# Patient Record
Sex: MALE | Race: BLACK OR AFRICAN AMERICAN | NOT HISPANIC OR LATINO | Employment: FULL TIME | URBAN - METROPOLITAN AREA
[De-identification: names, ages, dates, MRNs, and addresses within clinical notes are randomized per-mention and may not be internally consistent; named-entity substitution may affect disease eponyms.]

---

## 2019-07-17 ENCOUNTER — HOSPITAL ENCOUNTER (INPATIENT)
Facility: HOSPITAL | Age: 49
LOS: 2 days | Discharge: HOME | DRG: 305 | End: 2019-07-19
Attending: EMERGENCY MEDICINE | Admitting: EMERGENCY MEDICINE
Payer: OTHER GOVERNMENT

## 2019-07-17 DIAGNOSIS — I16.1 HYPERTENSIVE EMERGENCY: Primary | ICD-10-CM

## 2019-07-17 DIAGNOSIS — N18.2 CKD (CHRONIC KIDNEY DISEASE) STAGE 2, GFR 60-89 ML/MIN: ICD-10-CM

## 2019-07-17 DIAGNOSIS — R07.9 CHEST PAIN: ICD-10-CM

## 2019-07-17 LAB
ALBUMIN SERPL BCP-MCNC: 3.9 G/DL (ref 3.5–5.2)
ALP SERPL-CCNC: 64 U/L (ref 55–135)
ALT SERPL W/O P-5'-P-CCNC: 28 U/L (ref 10–44)
ANION GAP SERPL CALC-SCNC: 10 MMOL/L (ref 8–16)
AST SERPL-CCNC: 19 U/L (ref 10–40)
BASOPHILS # BLD AUTO: 0.04 K/UL (ref 0–0.2)
BASOPHILS NFR BLD: 1 % (ref 0–1.9)
BILIRUB SERPL-MCNC: 0.3 MG/DL (ref 0.1–1)
BNP SERPL-MCNC: 14 PG/ML (ref 0–99)
BUN SERPL-MCNC: 14 MG/DL (ref 6–20)
CALCIUM SERPL-MCNC: 9.3 MG/DL (ref 8.7–10.5)
CHLORIDE SERPL-SCNC: 108 MMOL/L (ref 95–110)
CO2 SERPL-SCNC: 24 MMOL/L (ref 23–29)
CREAT SERPL-MCNC: 1.3 MG/DL (ref 0.5–1.4)
DIFFERENTIAL METHOD: ABNORMAL
EOSINOPHIL # BLD AUTO: 0.1 K/UL (ref 0–0.5)
EOSINOPHIL NFR BLD: 3.1 % (ref 0–8)
ERYTHROCYTE [DISTWIDTH] IN BLOOD BY AUTOMATED COUNT: 14.6 % (ref 11.5–14.5)
EST. GFR  (AFRICAN AMERICAN): >60 ML/MIN/1.73 M^2
EST. GFR  (NON AFRICAN AMERICAN): >60 ML/MIN/1.73 M^2
GLUCOSE SERPL-MCNC: 100 MG/DL (ref 70–110)
HCT VFR BLD AUTO: 41.9 % (ref 40–54)
HGB BLD-MCNC: 13.3 G/DL (ref 14–18)
IMM GRANULOCYTES # BLD AUTO: 0.01 K/UL (ref 0–0.04)
IMM GRANULOCYTES NFR BLD AUTO: 0.3 % (ref 0–0.5)
LYMPHOCYTES # BLD AUTO: 1.6 K/UL (ref 1–4.8)
LYMPHOCYTES NFR BLD: 41.2 % (ref 18–48)
MCH RBC QN AUTO: 28.6 PG (ref 27–31)
MCHC RBC AUTO-ENTMCNC: 31.7 G/DL (ref 32–36)
MCV RBC AUTO: 90 FL (ref 82–98)
MONOCYTES # BLD AUTO: 0.5 K/UL (ref 0.3–1)
MONOCYTES NFR BLD: 13.4 % (ref 4–15)
NEUTROPHILS # BLD AUTO: 1.6 K/UL (ref 1.8–7.7)
NEUTROPHILS NFR BLD: 41 % (ref 38–73)
NRBC BLD-RTO: 0 /100 WBC
PLATELET # BLD AUTO: 242 K/UL (ref 150–350)
PMV BLD AUTO: 10.7 FL (ref 9.2–12.9)
POTASSIUM SERPL-SCNC: 3.3 MMOL/L (ref 3.5–5.1)
PROT SERPL-MCNC: 7 G/DL (ref 6–8.4)
RBC # BLD AUTO: 4.65 M/UL (ref 4.6–6.2)
SODIUM SERPL-SCNC: 142 MMOL/L (ref 136–145)
TROPONIN I SERPL DL<=0.01 NG/ML-MCNC: 0.02 NG/ML (ref 0–0.03)
TROPONIN I SERPL DL<=0.01 NG/ML-MCNC: 0.03 NG/ML (ref 0–0.03)
WBC # BLD AUTO: 3.81 K/UL (ref 3.9–12.7)

## 2019-07-17 PROCEDURE — 99223 1ST HOSP IP/OBS HIGH 75: CPT | Mod: ,,, | Performed by: HOSPITALIST

## 2019-07-17 PROCEDURE — 84484 ASSAY OF TROPONIN QUANT: CPT

## 2019-07-17 PROCEDURE — 83880 ASSAY OF NATRIURETIC PEPTIDE: CPT

## 2019-07-17 PROCEDURE — 96365 THER/PROPH/DIAG IV INF INIT: CPT

## 2019-07-17 PROCEDURE — 80053 COMPREHEN METABOLIC PANEL: CPT

## 2019-07-17 PROCEDURE — 99291 CRITICAL CARE FIRST HOUR: CPT | Mod: 25

## 2019-07-17 PROCEDURE — 93010 EKG 12-LEAD: ICD-10-PCS | Mod: ,,, | Performed by: INTERNAL MEDICINE

## 2019-07-17 PROCEDURE — 12000002 HC ACUTE/MED SURGE SEMI-PRIVATE ROOM

## 2019-07-17 PROCEDURE — 63600175 PHARM REV CODE 636 W HCPCS: Performed by: EMERGENCY MEDICINE

## 2019-07-17 PROCEDURE — 93010 ELECTROCARDIOGRAM REPORT: CPT | Mod: ,,, | Performed by: INTERNAL MEDICINE

## 2019-07-17 PROCEDURE — 96366 THER/PROPH/DIAG IV INF ADDON: CPT

## 2019-07-17 PROCEDURE — 99291 CRITICAL CARE FIRST HOUR: CPT | Mod: ,,, | Performed by: EMERGENCY MEDICINE

## 2019-07-17 PROCEDURE — 25000003 PHARM REV CODE 250: Performed by: HOSPITALIST

## 2019-07-17 PROCEDURE — 99291 PR CRITICAL CARE, E/M 30-74 MINUTES: ICD-10-PCS | Mod: ,,, | Performed by: EMERGENCY MEDICINE

## 2019-07-17 PROCEDURE — 96375 TX/PRO/DX INJ NEW DRUG ADDON: CPT

## 2019-07-17 PROCEDURE — 93005 ELECTROCARDIOGRAM TRACING: CPT

## 2019-07-17 PROCEDURE — 25000003 PHARM REV CODE 250: Performed by: EMERGENCY MEDICINE

## 2019-07-17 PROCEDURE — 25000003 PHARM REV CODE 250: Performed by: STUDENT IN AN ORGANIZED HEALTH CARE EDUCATION/TRAINING PROGRAM

## 2019-07-17 PROCEDURE — 99223 PR INITIAL HOSPITAL CARE,LEVL III: ICD-10-PCS | Mod: ,,, | Performed by: HOSPITALIST

## 2019-07-17 PROCEDURE — 85025 COMPLETE CBC W/AUTO DIFF WBC: CPT

## 2019-07-17 RX ORDER — AMLODIPINE BESYLATE 10 MG/1
10 TABLET ORAL DAILY
Status: DISCONTINUED | OUTPATIENT
Start: 2019-07-17 | End: 2019-07-19 | Stop reason: HOSPADM

## 2019-07-17 RX ORDER — ENOXAPARIN SODIUM 100 MG/ML
40 INJECTION SUBCUTANEOUS EVERY 24 HOURS
Status: DISCONTINUED | OUTPATIENT
Start: 2019-07-17 | End: 2019-07-19 | Stop reason: HOSPADM

## 2019-07-17 RX ORDER — HYDROCHLOROTHIAZIDE 25 MG/1
25 TABLET ORAL DAILY
Status: DISCONTINUED | OUTPATIENT
Start: 2019-07-17 | End: 2019-07-19 | Stop reason: HOSPADM

## 2019-07-17 RX ORDER — HYDRALAZINE HYDROCHLORIDE 25 MG/1
25 TABLET, FILM COATED ORAL EVERY 6 HOURS PRN
Status: DISCONTINUED | OUTPATIENT
Start: 2019-07-17 | End: 2019-07-19 | Stop reason: HOSPADM

## 2019-07-17 RX ORDER — SODIUM CHLORIDE 0.9 % (FLUSH) 0.9 %
10 SYRINGE (ML) INJECTION
Status: DISCONTINUED | OUTPATIENT
Start: 2019-07-17 | End: 2019-07-19 | Stop reason: HOSPADM

## 2019-07-17 RX ORDER — HYDROCHLOROTHIAZIDE 25 MG/1
25 TABLET ORAL DAILY
Status: DISCONTINUED | OUTPATIENT
Start: 2019-07-18 | End: 2019-07-17

## 2019-07-17 RX ORDER — ENALAPRILAT 1.25 MG/ML
1.25 INJECTION INTRAVENOUS
Status: COMPLETED | OUTPATIENT
Start: 2019-07-17 | End: 2019-07-17

## 2019-07-17 RX ORDER — POTASSIUM CHLORIDE 20 MEQ/1
40 TABLET, EXTENDED RELEASE ORAL ONCE
Status: COMPLETED | OUTPATIENT
Start: 2019-07-17 | End: 2019-07-17

## 2019-07-17 RX ORDER — NITROGLYCERIN 20 MG/100ML
20 INJECTION INTRAVENOUS CONTINUOUS
Status: DISCONTINUED | OUTPATIENT
Start: 2019-07-17 | End: 2019-07-17

## 2019-07-17 RX ORDER — ACETAMINOPHEN 325 MG/1
650 TABLET ORAL EVERY 6 HOURS PRN
Status: DISCONTINUED | OUTPATIENT
Start: 2019-07-17 | End: 2019-07-19 | Stop reason: HOSPADM

## 2019-07-17 RX ORDER — SODIUM CHLORIDE 0.9 % (FLUSH) 0.9 %
10 SYRINGE (ML) INJECTION
Status: DISCONTINUED | OUTPATIENT
Start: 2019-07-17 | End: 2019-07-17

## 2019-07-17 RX ORDER — NITROGLYCERIN 0.4 MG/1
0.4 TABLET SUBLINGUAL EVERY 5 MIN PRN
Status: DISCONTINUED | OUTPATIENT
Start: 2019-07-17 | End: 2019-07-17

## 2019-07-17 RX ORDER — ASPIRIN 325 MG
325 TABLET ORAL
Status: DISCONTINUED | OUTPATIENT
Start: 2019-07-17 | End: 2019-07-17

## 2019-07-17 RX ADMIN — AMLODIPINE BESYLATE 10 MG: 10 TABLET ORAL at 10:07

## 2019-07-17 RX ADMIN — ENALAPRILAT 1.25 MG: 2.5 INJECTION INTRAVENOUS at 06:07

## 2019-07-17 RX ADMIN — HYDRALAZINE HYDROCHLORIDE 25 MG: 25 TABLET, FILM COATED ORAL at 11:07

## 2019-07-17 RX ADMIN — NITROGLYCERIN 0.4 MG: 0.4 TABLET SUBLINGUAL at 04:07

## 2019-07-17 RX ADMIN — POTASSIUM CHLORIDE 40 MEQ: 20 TABLET, EXTENDED RELEASE ORAL at 06:07

## 2019-07-17 RX ADMIN — NITROGLYCERIN 20 MCG/MIN: 20 INJECTION INTRAVENOUS at 05:07

## 2019-07-17 RX ADMIN — HYDROCHLOROTHIAZIDE 25 MG: 25 TABLET ORAL at 10:07

## 2019-07-17 NOTE — ED TRIAGE NOTES
Per /149 en route, pt reports 4/10 chest pain brought down to 1/10 with 324 asp and 3 sublingual sprays nitroglycerin. 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 Reports taking 1 Goody's prior to calling EMS. Denies n/v/d, SOB, pain with urination. Current pain 0.5/10.

## 2019-07-17 NOTE — ED NOTES
Patient identifiers verified and correct for Jonah CHET Gordon.    LOC: The patient is awake and alert; oriented x 3 and speaking appropriately.  APPEARANCE: Patient resting comfortably, patient is clean and well groomed.  SKIN: warm and dry, normal skin turgor & moist mucus membranes, skin intact, no breakdown noted.  MUSCULOSKELETAL: Patient moving all extremities well, no obvious swelling or deformities noted.  RESPIRATORY: Airway is open and patent; respirations are spontaneous, normal effort and rate. Denies SOB.  CARDIAC: Patient has a normal rate, no peripheral edema noted, capillary refill < 3 seconds; non-radiating left pectoral chest pain 4/10 originally, 0.5/10 currently.  ABDOMEN: Soft and non tender to palpation, no distention noted. Denies n/v/d.    Pt placed on cardiac monitor, continuous pulse ox, cycling blood pressures. Side rails up x2, call bell in reach, bed in low position with brake engaged. Will continue to monitor.

## 2019-07-17 NOTE — PROVIDER PROGRESS NOTES - EMERGENCY DEPT.
Encounter Date: 7/17/2019    ED Physician Progress Notes           ED Attending Sign-out Progress Note:  Patient signed out to me at shift change by Dr. Hastings to f/u labs, response to treatment and overall disposition. It was expected that pt would be admitted for hypertensive emergency.    Patient complaining of recurrent chest pain. Was nitroglycerin responsive earlier. /93. Nitro infusion ordered.    Patient had improvement of pain with nitro, but BP dropped with minimal amounts. Nitro infusion stopped and IV enalaprilat given.    Discussed with internal medicine who admitted pt.    Attending Critical Care:   Critical Care Times:   ==============================================================  ·            Total Critical Care Time - exclusive of teaching and procedural time: 30 minutes.  ==============================================================  Critical care was necessary to treat or prevent imminent or life-threatening deterioration of the following conditions: hypertensive emergency.   Critical care was time spent personally by me on the following activities: obtaining history from patient or relative, examination of patient, review of old charts, ordering lab, x-rays, and/or EKG, development of treatment plan with patient or relative, ordering and performing treatments and interventions, evaluation of patient's response to treatment, discussion with consultants, interpretation of cardiac measurements and re-evaluation of patient's condition.   Critical Care Condition: potentially life-threatening.       ED Clinical Impression:    ICD-10-CM ICD-9-CM   1. Hypertensive emergency I16.1 401.9   2. Chest pain R07.9 786.50

## 2019-07-17 NOTE — ED PROVIDER NOTES
Encounter Date: 7/17/2019    SCRIBE #1 NOTE: I, Vickey Ndiaye, am scribing for, and in the presence of,  Dr. Hastings. I have scribed the following portions of the note - Other sections scribed: HPI, ROS, PE.       History     Chief Complaint   Patient presents with    Chest Pain     left sided chest pain x 1 hour     49 y.o. male with history of HTN, presenting with left-sided chest pain. Patient reports around 2:30pm one side chest pain. Patient reports the pain as non radiating. Patient's pain is described as an achy mild pain. The pain at worst was a 4/10 and now pain is a .5/10. Patient is noncompliant with blood pressure medicine. Patient denies nausea, vomiting, diarrhea, fever, cough, shortness of breath, abdominal pain, or dysuria. Patient reports Asprin, goodies powder, and 3 shots of nitroglycerin have moderated the pain.     The history is provided by the patient and medical records.     Review of patient's allergies indicates:  No Known Allergies  Past Medical History:   Diagnosis Date    Hypertension      Past Surgical History:   Procedure Laterality Date    WISDOM TOOTH EXTRACTION       History reviewed. No pertinent family history.  Social History     Tobacco Use    Smoking status: Never Smoker    Smokeless tobacco: Never Used   Substance Use Topics    Alcohol use: Yes    Drug use: Never     Review of Systems   Constitutional: Negative for fever.   HENT: Negative for sore throat.    Eyes: Negative for visual disturbance.   Respiratory: Negative for cough and shortness of breath.    Cardiovascular: Positive for chest pain.   Gastrointestinal: Negative for abdominal pain, diarrhea, nausea and vomiting.   Genitourinary: Negative for dysuria.   Musculoskeletal: Negative for neck pain.   Skin: Negative for rash and wound.   Allergic/Immunologic: Negative for immunocompromised state.   Neurological: Negative for syncope.   Psychiatric/Behavioral: Negative for confusion.   All other systems reviewed  and are negative.      Physical Exam     Initial Vitals [07/17/19 1537]   BP Pulse Resp Temp SpO2   (!) 210/132 79 16 98 °F (36.7 °C) 100 %      MAP       --         Physical Exam    Nursing note and vitals reviewed.  Constitutional: He appears well-developed and well-nourished. He is not diaphoretic. No distress.   HENT:   Head: Normocephalic and atraumatic.   Nose: Nose normal.   Eyes: Conjunctivae are normal. Right eye exhibits no discharge. Left eye exhibits no discharge.   Neck: Normal range of motion. Neck supple.   Cardiovascular: Normal rate, regular rhythm and normal heart sounds. Exam reveals no gallop and no friction rub.    No murmur heard.  Pulmonary/Chest: Breath sounds normal. No respiratory distress. He has no wheezes. He has no rhonchi. He has no rales.   Abdominal: Soft. He exhibits no distension. There is no tenderness. There is no rebound and no guarding.   Musculoskeletal: Normal range of motion. He exhibits no edema or tenderness.   Neurological: He is alert and oriented to person, place, and time. GCS score is 15. GCS eye subscore is 4. GCS verbal subscore is 5. GCS motor subscore is 6.   Skin: Skin is warm and dry. No rash noted. No erythema.   Psychiatric: He has a normal mood and affect. His behavior is normal. Judgment and thought content normal.         ED Course   Critical Care  Date/Time: 7/17/2019 9:15 PM  Performed by: Dat Hastings MD  Authorized by: Dat Hastings MD   Direct patient critical care time: 5 minutes  Additional history critical care time: 5 minutes  Ordering / reviewing critical care time: 5 minutes  Documentation critical care time: 5 minutes  Consulting other physicians critical care time: 5 minutes  Consult with family critical care time: 5 minutes  Other critical care time: 0 minutes  Total critical care time (exclusive of procedural time) : 30 minutes  Critical care time was exclusive of separately billable procedures and treating other patients.  Critical care  was necessary to treat or prevent imminent or life-threatening deterioration of the following conditions: circulatory failure and cardiac failure.  Critical care was time spent personally by me on the following activities: development of treatment plan with patient or surrogate, evaluation of patient's response to treatment, examination of patient, obtaining history from patient or surrogate, ordering and performing treatments and interventions, ordering and review of laboratory studies, ordering and review of radiographic studies, pulse oximetry and re-evaluation of patient's condition.  Subsequent provider of critical care: I assumed direction of critical care for this patient from another provider of my specialty.        Labs Reviewed   CBC W/ AUTO DIFFERENTIAL - Abnormal; Notable for the following components:       Result Value    WBC 3.81 (*)     Hemoglobin 13.3 (*)     Mean Corpuscular Hemoglobin Conc 31.7 (*)     RDW 14.6 (*)     Gran # (ANC) 1.6 (*)     All other components within normal limits   COMPREHENSIVE METABOLIC PANEL - Abnormal; Notable for the following components:    Potassium 3.3 (*)     All other components within normal limits   TROPONIN I   B-TYPE NATRIURETIC PEPTIDE   TROPONIN I        ECG Results          EKG 12-lead (In process)  Result time 07/17/19 16:49:58    In process by Interface, Lab In [facility] (07/17/19 16:49:58)                 Narrative:    Test Reason : R07.9,    Vent. Rate : 081 BPM     Atrial Rate : 081 BPM     P-R Int : 178 ms          QRS Dur : 088 ms      QT Int : 394 ms       P-R-T Axes : 056 098 007 degrees     QTc Int : 457 ms    Normal sinus rhythm with sinus arrhythmia  Rightward axis  Borderline Abnormal ECG  No previous ECGs available    Referred By: [email or web address]   SELF           Confirmed By:                             Imaging Results          X-Ray Chest AP Portable (Final result)  Result time 07/17/19 16:37:16    Final result by Cristiano Shanks MD (07/17/19 16:37:16)                  Impression:      Enlarged cardiac silhouette without radiographic evidence of failure or focal consolidation.      Electronically signed by: Cristiano Shanks MD  Date:    07/17/2019  Time:    16:37             Narrative:    EXAMINATION:  XR CHEST AP PORTABLE    CLINICAL HISTORY:  Chest Pain;    TECHNIQUE:  AP portable view of the chest.    COMPARISON:  Lumbar spine series 03/14/2012    FINDINGS:  Monitoring leads overlie the chest.  Large body habitus.    Cardiac silhouette is mildly enlarged without evidence of failure.  Mediastinal contours are within normal limits.  Pulmonary vasculature and hilar regions are within normal limits.  The lungs are symmetrically well expanded without focal consolidation, pleural effusion or pneumothorax.  No acute osseous process seen.  PA and lateral views can be obtained.                                 Medical Decision Making:   History:   Old Medical Records: I decided to obtain old medical records.  Initial Assessment:   49 y.o. male with history of HTN, presenting with left-sided chest pain and high BP. Initial impression is hypertensive emergency.   Differential Diagnosis:   Hypertensive emergency:  History of uncontrolled HTN, high BP >200 systolic, chest pain.    MI:  Chest pain, history of uncontrolled HTN        Independently Interpreted Test(s):   I have ordered and independently interpreted X-rays - see prior notes.  I have ordered and independently interpreted EKG Reading(s) - see prior notes  Clinical Tests:   Lab Tests: Ordered and Reviewed  Radiological Study: Ordered and Reviewed  Medical Tests: Ordered and Reviewed  ED Management:  4:30 PM  Troponin, BNP, EKG, CBC, comprehensive metabolic panel, and CXR  Nitroglycerin 0.4 tablet.    5:39 PM  Patient complaining that the chest pain has returned. Nitroglycerin changed to drip.    5:58 PM  Chest pain resolved. K is 3.4. KCL 40 mEq is ordered.  Other:   I discussed test(s) with the performing  physician.            Bev Attestation:   Scribe #1: I performed the above scribed service and the documentation accurately describes the services I performed. I attest to the accuracy of the note.            ED Course as of Jul 17 1812 Wed Jul 17, 2019   1745 41-year-old male past medical history of hypertension presents today with hypertensive emergency.  Patient reports chest pain since 2:00 p.m. today.  On arrival afebrile.  Hypertensive to 210/132.  EKG no STEMI.  Will initiate cardiac workup.    [BD]   1746 Blood pressure improved and chest pain resolved with nitroglycerin.  Signed out to Dr. Bartlett pending labs and likely admission for hypertensive emergency.     [BD]      ED Course User Index  [BD] Dat Hastings MD     Clinical Impression:       ICD-10-CM ICD-9-CM   1. Hypertensive emergency I16.1 401.9   2. Chest pain R07.9 786.50   3. Myocardial infarction, unspecified MI type, unspecified artery I21.9 410.90                                Dat Hastings MD  07/17/19 2087

## 2019-07-18 PROBLEM — N18.2 CKD (CHRONIC KIDNEY DISEASE) STAGE 2, GFR 60-89 ML/MIN: Status: ACTIVE | Noted: 2019-07-18

## 2019-07-18 LAB
ALBUMIN SERPL BCP-MCNC: 3.9 G/DL (ref 3.5–5.2)
ALP SERPL-CCNC: 65 U/L (ref 55–135)
ALT SERPL W/O P-5'-P-CCNC: 27 U/L (ref 10–44)
ANION GAP SERPL CALC-SCNC: 9 MMOL/L (ref 8–16)
ANION GAP SERPL CALC-SCNC: 9 MMOL/L (ref 8–16)
AST SERPL-CCNC: 18 U/L (ref 10–40)
BASOPHILS # BLD AUTO: 0.04 K/UL (ref 0–0.2)
BASOPHILS NFR BLD: 1 % (ref 0–1.9)
BILIRUB SERPL-MCNC: 0.3 MG/DL (ref 0.1–1)
BUN SERPL-MCNC: 17 MG/DL (ref 6–20)
BUN SERPL-MCNC: 17 MG/DL (ref 6–20)
CALCIUM SERPL-MCNC: 9.7 MG/DL (ref 8.7–10.5)
CALCIUM SERPL-MCNC: 9.7 MG/DL (ref 8.7–10.5)
CHLORIDE SERPL-SCNC: 107 MMOL/L (ref 95–110)
CHLORIDE SERPL-SCNC: 107 MMOL/L (ref 95–110)
CHOLEST SERPL-MCNC: 183 MG/DL (ref 120–199)
CHOLEST/HDLC SERPL: 4.4 {RATIO} (ref 2–5)
CO2 SERPL-SCNC: 24 MMOL/L (ref 23–29)
CO2 SERPL-SCNC: 24 MMOL/L (ref 23–29)
CREAT SERPL-MCNC: 1.4 MG/DL (ref 0.5–1.4)
CREAT SERPL-MCNC: 1.4 MG/DL (ref 0.5–1.4)
DIFFERENTIAL METHOD: ABNORMAL
EOSINOPHIL # BLD AUTO: 0.2 K/UL (ref 0–0.5)
EOSINOPHIL NFR BLD: 4.7 % (ref 0–8)
ERYTHROCYTE [DISTWIDTH] IN BLOOD BY AUTOMATED COUNT: 14.6 % (ref 11.5–14.5)
EST. GFR  (AFRICAN AMERICAN): >60 ML/MIN/1.73 M^2
EST. GFR  (AFRICAN AMERICAN): >60 ML/MIN/1.73 M^2
EST. GFR  (NON AFRICAN AMERICAN): 59 ML/MIN/1.73 M^2
EST. GFR  (NON AFRICAN AMERICAN): 59 ML/MIN/1.73 M^2
ESTIMATED AVG GLUCOSE: 131 MG/DL (ref 68–131)
GLUCOSE SERPL-MCNC: 101 MG/DL (ref 70–110)
GLUCOSE SERPL-MCNC: 101 MG/DL (ref 70–110)
HBA1C MFR BLD HPLC: 6.2 % (ref 4–5.6)
HCT VFR BLD AUTO: 43.5 % (ref 40–54)
HDLC SERPL-MCNC: 42 MG/DL (ref 40–75)
HDLC SERPL: 23 % (ref 20–50)
HGB BLD-MCNC: 13.7 G/DL (ref 14–18)
IMM GRANULOCYTES # BLD AUTO: 0.01 K/UL (ref 0–0.04)
IMM GRANULOCYTES NFR BLD AUTO: 0.3 % (ref 0–0.5)
LDLC SERPL CALC-MCNC: 116 MG/DL (ref 63–159)
LYMPHOCYTES # BLD AUTO: 1.5 K/UL (ref 1–4.8)
LYMPHOCYTES NFR BLD: 39.6 % (ref 18–48)
MAGNESIUM SERPL-MCNC: 2.4 MG/DL (ref 1.6–2.6)
MCH RBC QN AUTO: 28.6 PG (ref 27–31)
MCHC RBC AUTO-ENTMCNC: 31.5 G/DL (ref 32–36)
MCV RBC AUTO: 91 FL (ref 82–98)
MONOCYTES # BLD AUTO: 0.6 K/UL (ref 0.3–1)
MONOCYTES NFR BLD: 14.8 % (ref 4–15)
NEUTROPHILS # BLD AUTO: 1.5 K/UL (ref 1.8–7.7)
NEUTROPHILS NFR BLD: 39.6 % (ref 38–73)
NONHDLC SERPL-MCNC: 141 MG/DL
NRBC BLD-RTO: 0 /100 WBC
PLATELET # BLD AUTO: 242 K/UL (ref 150–350)
PMV BLD AUTO: 10.3 FL (ref 9.2–12.9)
POTASSIUM SERPL-SCNC: 3.4 MMOL/L (ref 3.5–5.1)
POTASSIUM SERPL-SCNC: 3.4 MMOL/L (ref 3.5–5.1)
PROT SERPL-MCNC: 7.1 G/DL (ref 6–8.4)
RBC # BLD AUTO: 4.79 M/UL (ref 4.6–6.2)
SODIUM SERPL-SCNC: 140 MMOL/L (ref 136–145)
SODIUM SERPL-SCNC: 140 MMOL/L (ref 136–145)
TRIGL SERPL-MCNC: 125 MG/DL (ref 30–150)
TSH SERPL DL<=0.005 MIU/L-ACNC: 2.71 UIU/ML (ref 0.4–4)
WBC # BLD AUTO: 3.84 K/UL (ref 3.9–12.7)

## 2019-07-18 PROCEDURE — 84443 ASSAY THYROID STIM HORMONE: CPT

## 2019-07-18 PROCEDURE — 80061 LIPID PANEL: CPT

## 2019-07-18 PROCEDURE — 99232 SBSQ HOSP IP/OBS MODERATE 35: CPT | Mod: ,,, | Performed by: HOSPITALIST

## 2019-07-18 PROCEDURE — 11000001 HC ACUTE MED/SURG PRIVATE ROOM

## 2019-07-18 PROCEDURE — 80053 COMPREHEN METABOLIC PANEL: CPT

## 2019-07-18 PROCEDURE — 25000003 PHARM REV CODE 250: Performed by: HOSPITALIST

## 2019-07-18 PROCEDURE — 99232 PR SUBSEQUENT HOSPITAL CARE,LEVL II: ICD-10-PCS | Mod: ,,, | Performed by: HOSPITALIST

## 2019-07-18 PROCEDURE — 85025 COMPLETE CBC W/AUTO DIFF WBC: CPT

## 2019-07-18 PROCEDURE — 83735 ASSAY OF MAGNESIUM: CPT

## 2019-07-18 PROCEDURE — 83036 HEMOGLOBIN GLYCOSYLATED A1C: CPT

## 2019-07-18 RX ADMIN — AMLODIPINE BESYLATE 10 MG: 10 TABLET ORAL at 08:07

## 2019-07-18 RX ADMIN — HYDRALAZINE HYDROCHLORIDE 25 MG: 25 TABLET, FILM COATED ORAL at 06:07

## 2019-07-18 RX ADMIN — HYDROCHLOROTHIAZIDE 25 MG: 25 TABLET ORAL at 08:07

## 2019-07-18 RX ADMIN — ACETAMINOPHEN 650 MG: 325 TABLET ORAL at 08:07

## 2019-07-18 NOTE — PLAN OF CARE
CM met with patient for discharge planning.  Patient states he lives with his wife and mother-in-law in a two story house with 13 steps to climb with a railing.  Patient states he is independent with ADL's.  Plan is to discharge home with family.    Pharmacy:    [facility] [id number] [id number] [address]  [facility] [place] [address]  Phone: [phone] Fax: [phone]    PCP:  Tayo Chin Jr, MD    Payor:  / Plan:  [employer] / Product Type: [facility] /      07/18/19 1442   Discharge Assessment   Assessment Type Discharge Planning Assessment   Confirmed/corrected address and phone number on facesheet? Yes   Assessment information obtained from? Patient   Expected Length of Stay (days) 3   Communicated expected length of stay with patient/caregiver yes   Prior to hospitilization cognitive status: Alert/Oriented   Prior to hospitalization functional status: Independent   Current cognitive status: Alert/Oriented   Current Functional Status: Independent   Facility Arrived From: Emergency room   Lives With spouse;parent(s)   Able to Return to Prior Arrangements yes   Is patient able to care for self after discharge? Yes   Who are your caregiver(s) and their phone number(s)? Lety Gordon - spouse    Patient's perception of discharge disposition home or selfcare   Readmission Within the Last 30 Days no previous admission in last 30 days   Patient currently being followed by outpatient case management? No   Patient currently receives any other outside agency services? No   Equipment Currently Used at Home none   Do you have any problems affording any of your prescribed medications? No   Is the patient taking medications as prescribed? yes   Does the patient have transportation home? Yes   Transportation Anticipated family or friend will provide   Does the patient receive services at the [facility]? No   Discharge  Plan A Home with family   Discharge Plan B Home Health   DME Needed Upon Discharge  none   Patient/Family in Agreement with Plan yes

## 2019-07-18 NOTE — HPI
Jonah Gordon is a 49 y.o. man with a history of HTN and Morbid Obesity who presents to [facility] complaini of acute onset left-sided chest pain.     Patient reports that earlier this afternoon he experienced sudden onset left-sided chest pain with exertion while walking from his home to his car.  He describes the pain as sharp and pressure-like with no radiation, 8/10 in intensity.  He denies associated shortness of breath, headache, and myalgias.  The pain was nonreproducible.  He denies having chest pain like this prior to this episode.  He denies recent musculoskeletal tear or chest trauma.  He denies hemoptysis, fevers, chills, recent prolonged travel or prolonged immobility, and asymmetric lower extremity swelling. Of note, he has a history of HTN and has been off all of his blood pressure medicines for approximately 10 months.  When asked why he has not been taking any blood pressure medicines he states that he is currently in between primary care physicians.  He denies adherence to a low-salt, fluid-restricted diet and does not check his blood pressure at home on a regular basis.  He is not currently taking any medications scheduled.     In the ED, triage /132.  He was given nitroglycerin spray x3 and EMS as well as 325 aspirin x1.  He was started on a nitroglycerin drip briefly with improvement and MAP to < 118 mm Hg with resolution of his chest pain. Troponin negative x2 and EKG NSR with no acute ischemic changes in contiguous leads.  CXR with no consolidation or evidence of pulmonary edema.

## 2019-07-18 NOTE — ASSESSMENT & PLAN NOTE
Lab Results   Component Value Date    CREATININE 1.4 07/18/2019    CREATININE 1.4 07/18/2019     Sr Cr stable  Cont to monitor with daily labs   Avoid nephrotoxins.   Renally dose all medications   Monitor urine output

## 2019-07-18 NOTE — PROGRESS NOTES
Med Team T contacted from systolic BP > 210. Pt has no complaints of pain or discomfort. No new orders at this time

## 2019-07-18 NOTE — NURSING
BP rechecked PRN blood pressure med administered, no c/o chest pain or headache, re calibrated visi for consistent monitoring

## 2019-07-18 NOTE — PLAN OF CARE
Problem: Adult Inpatient Plan of Care  Goal: Plan of Care Review  Outcome: Unable to achieve outcome(s) by discharge     07/18/19 2535   Plan of Care Review   Plan of Care Reviewed With patient;caregiver   Progress improving   Outcome Summary Pt BP improving

## 2019-07-18 NOTE — ED NOTES
Hourly rounding complete. Patient resting in stretcher and is in NAD at this time. Pt is awake alert and oriented x4, VSS, respirations even and unlabored. Pt denies pain at this time. Pt updated on POC. Bed low and locked with side rails up x2, call bell in pt reach. Pt voices no needs at this time. Wife bedside.

## 2019-07-18 NOTE — NURSING
Pt sitting on side of bed wife at bedside, education regarding diet, medication, keeping a record of blood pressure and symptoms to alert MD were discussed and well received.

## 2019-07-18 NOTE — H&P
Hospital Medicine  History and Physical Exam    Team: Networked reference to record PCT  Vickey Gandara MD  Admit Date: 7/17/2019  Principal Problem:  Hypertensive emergency   Patient information was obtained from patient and ER records.   Primary care Physician: Primary Doctor No  Code status: Full Code    CC:  Left-sided chest pain    HPI:   Jonah Gordon is a 49 y.o. man with a history of HTN and Morbid Obesity who presents to [facility] complaini of acute onset left-sided chest pain.    Patient reports that earlier this afternoon he experienced sudden onset left-sided chest pain with exertion while walking from his home to his car.  He describes the pain as sharp and pressure-like with no radiation, 8/10 in intensity.  He denies associated shortness of breath, headache, and myalgias.  The pain was nonreproducible.  He denies having chest pain like this prior to this episode.  He denies recent musculoskeletal tear or chest trauma.  He denies hemoptysis, fevers, chills, recent prolonged travel or prolonged immobility, and asymmetric lower extremity swelling. Of note, he has a history of HTN and has been off all of his blood pressure medicines for approximately 10 months.  When asked why he has not been taking any blood pressure medicines he states that he is currently in between primary care physicians.  He denies adherence to a low-salt, fluid-restricted diet and does not check his blood pressure at home on a regular basis.  He is not currently taking any medications scheduled.    In the ED, triage /132.  He was given nitroglycerin spray x3 and EMS as well as 325 aspirin x1.  He was started on a nitroglycerin drip briefly with improvement and MAP to < 118 mm Hg with resolution of his chest pain. Troponin negative x2 and EKG NSR with no acute ischemic changes in contiguous leads.  CXR with no consolidation or evidence of pulmonary edema.    Past Medical History: Patient has a past medical history of  Hypertension.    Past Surgical History: Patient has a past surgical history that includes Bertha tooth extraction.    Social History: Patient reports that he has never smoked. He has never used smokeless tobacco. He reports that he drinks alcohol. He reports that he does not use drugs.    Family History: family history is not on file.    Medications: reviewed.  Patient not currently taking any scheduled medicines     Allergies: Patient has No Known Allergies.    ROS  Pain Scale:  2 /10   Constitutional:  fever or chills  Respiratory: no cough or shortness of breath  Cardiovascular:  Positive chest pain, negative palpitations  Gastrointestinal: no nausea or vomiting, no abdominal pain or change in bowel habits  Genitourinary: no hematuria or dysuria  Integument/Breast: no rash or pruritis  Hematologic/Lymphatic: no easy bruising or lymphadenopathy  Musculoskeletal: no arthralgias or myalgias  Neurological: no seizures or tremors  Behavioral/Psych: no depression or anxiety    PEx  Temp:  [98 °F (36.7 °C)]   Pulse:  [63-79]   Resp:  [11-21]   BP: (149-210)/()   SpO2:  [92 %-100 %]   Body mass index is 37.6 kg/m².   No intake or output data in the 24 hours ending 07/17/19 2208    General appearance: no distress, speaking in full sentences, morbidly obese based on BMI with increased musculature  Mental status: Alert and oriented x 3  HEENT:  conjunctivae/corneas clear, PERRL  Pulm:   normal respiratory effort, CTA B, no c/w/r  Card: RRR, S1, S2 normal, no murmur, click, rub or gallop  Abd: soft, NT, ND, BS present; no masses, no organomegaly  Ext: no c/c/e  Pulses: 2+, symmetric  Skin: color, texture, turgor normal. No rashes or lesions  Neuro: CN II-XII grossly intact, no focal numbness or weakness, normal strength and tone     Recent Results (from the past 24 hour(s))   CBC auto differential    Collection Time: 07/17/19  4:04 PM   Result Value Ref Range    WBC 3.81 (L) 3.90 - 12.70 K/uL    RBC 4.65 4.60 - 6.20  M/uL    Hemoglobin 13.3 (L) 14.0 - 18.0 g/dL    Hematocrit 41.9 40.0 - 54.0 %    Mean Corpuscular Volume 90 82 - 98 fL    Mean Corpuscular Hemoglobin 28.6 27.0 - 31.0 pg    Mean Corpuscular Hemoglobin Conc 31.7 (L) 32.0 - 36.0 g/dL    RDW 14.6 (H) 11.5 - 14.5 %    Platelets 242 150 - 350 K/uL    MPV 10.7 9.2 - 12.9 fL    Immature Granulocytes 0.3 0.0 - 0.5 %    Gran # (ANC) 1.6 (L) 1.8 - 7.7 K/uL    Immature Grans (Abs) 0.01 0.00 - 0.04 K/uL    Lymph # 1.6 1.0 - 4.8 K/uL    Mono # 0.5 0.3 - 1.0 K/uL    Eos # 0.1 0.0 - 0.5 K/uL    Baso # 0.04 0.00 - 0.20 K/uL    nRBC 0 0 /100 WBC    Gran% 41.0 38.0 - 73.0 %    Lymph% 41.2 18.0 - 48.0 %    Mono% 13.4 4.0 - 15.0 %    Eosinophil% 3.1 0.0 - 8.0 %    Basophil% 1.0 0.0 - 1.9 %    Differential Method Automated    Comprehensive metabolic panel    Collection Time: 07/17/19  4:04 PM   Result Value Ref Range    Sodium 142 136 - 145 mmol/L    Potassium 3.3 (L) 3.5 - 5.1 mmol/L    Chloride 108 95 - 110 mmol/L    CO2 24 23 - 29 mmol/L    Glucose 100 70 - 110 mg/dL    BUN, Bld 14 6 - 20 mg/dL    Creatinine 1.3 0.5 - 1.4 mg/dL    Calcium 9.3 8.7 - 10.5 mg/dL    Total Protein 7.0 6.0 - 8.4 g/dL    Albumin 3.9 3.5 - 5.2 g/dL    Total Bilirubin 0.3 0.1 - 1.0 mg/dL    Alkaline Phosphatase 64 55 - 135 U/L    AST 19 10 - 40 U/L    ALT 28 10 - 44 U/L    Anion Gap 10 8 - 16 mmol/L    eGFR if African American >60.0 >60 mL/min/1.73 m^2    eGFR if non African American >60.0 >60 mL/min/1.73 m^2   Troponin I #1    Collection Time: 07/17/19  4:04 PM   Result Value Ref Range    Troponin I 0.024 0.000 - 0.026 ng/mL   B-Type natriuretic peptide (BNP)    Collection Time: 07/17/19  4:04 PM   Result Value Ref Range    BNP 14 0 - 99 pg/mL   Troponin I #2    Collection Time: 07/17/19  7:01 PM   Result Value Ref Range    Troponin I 0.025 0.000 - 0.026 ng/mL       Active Hospital Problems    Diagnosis  POA    *Hypertensive emergency [I16.1]  Yes      Resolved Hospital Problems   No resolved problems to  display.       Assessment and Plan:  Jonah Gordon is a 49 y.o. man with a history of HTN and Morbid Obesity who [place] [facility] [place] of acute onset left-sided chest pain. Admitted to Hospital Medicine for Hypertensive Emergency.    Hypertensive Emergency (resolved)  Resistant Hypertension  · Admit to Hospital Medicine for further evaluation and management.  In the ED, triage /132.  He was given nitroglycerin spray x3 and EMS as well as 325 aspirin x1.  He was started on a nitroglycerin drip briefly with improvement and MAP to < 118 mm Hg with resolution of his chest pain. Troponin negative x2 and EKG NSR with no acute ischemic changes in contiguous leads.  CXR with no consolidation or evidence of pulmonary edema.  · Goal MAP approximately 118 mmHg to decreased by 25% and 1st hour  · Starting HCTZ 25 mg daily and Amlodipine 10 mg daily for resistant HTN.  Losartan can be added as outpatient if unable to achieve BP goal  · Hydralazine 25 mg p.o. p.r.n. SBP > 190 mm Hg, DBP > 110 mm HG, or MAP > 125 mm HG  · Follow-up TSH, A1c, and lipid panel for further cardiac risk stratification.  Calculate ASCVD risk score to determine if patient warrants further cardiac risk stratification or statin therapy.  · Daily BMP and magnesium    DVT PPx:  Lovenox 40 mg daily  Diet:  Cardiac  --  Disposition:  Home, likely tomorrow pending improvement in BP  Disposition Needs:  PCP follow-up within 1-2 weeks of discharge      Vickey Gandara M.D.  Hospital Medicine Staff  [facility]

## 2019-07-18 NOTE — ASSESSMENT & PLAN NOTE
BP Readings from Last 3 Encounters:   07/18/19 (!) 170/109     Better controlled  Cont HCTZ and amlodipine to treat  Cont hydralazine PRN  Cardiac diet

## 2019-07-18 NOTE — PLAN OF CARE
Problem: Adult Inpatient Plan of Care  Goal: Patient-Specific Goal (Individualization)  Outcome: Ongoing (interventions implemented as appropriate)  Pt remains free from and injury of falls during shift. Awake, alert, and oriented x4. Vital signs stable. PRN medication given for systolic > 190 x 1. No signs of acute distress noted at this time. Bed in lowest position, wheels locked, and bed alarm on. Call light in reach. Will continue to monitor.

## 2019-07-18 NOTE — SUBJECTIVE & OBJECTIVE
Interval History: denies CP or SOB. BP slightly better. No complaints.     Review of Systems   Constitutional: Negative.    HENT: Negative.    Respiratory: Negative.    Cardiovascular: Negative.    Gastrointestinal: Negative.    Genitourinary: Negative.    Musculoskeletal: Negative.      Objective:     Vital Signs (Most Recent):  Temp: 97.5 °F (36.4 °C) (07/18/19 0816)  Pulse: 79 (07/18/19 1029)  Resp: (!) 22 (07/18/19 1029)  BP: (!) 183/117 (07/18/19 1029)  SpO2: 98 % (07/18/19 1029) Vital Signs (24h Range):  Temp:  [97.5 °F (36.4 °C)-98.6 °F (37 °C)] 97.5 °F (36.4 °C)  Pulse:  [] 79  Resp:  [11-22] 22  SpO2:  [92 %-100 %] 98 %  BP: (149-210)/() 183/117     Weight: 134 kg (295 lb 6.7 oz)  Body mass index is 38.98 kg/m².  No intake or output data in the 24 hours ending 07/18/19 1032   Physical Exam   Constitutional: He is oriented to person, place, and time. He appears well-developed and well-nourished. No distress.   HENT:   Head: Normocephalic and atraumatic.   Eyes: Pupils are equal, round, and reactive to light. No scleral icterus.   Neck: Normal range of motion.   Cardiovascular: Normal rate, regular rhythm and normal heart sounds.   No murmur heard.  Pulmonary/Chest: Effort normal and breath sounds normal. No respiratory distress. He has no wheezes.   Abdominal: Soft. Bowel sounds are normal. He exhibits no distension. There is no tenderness.   Musculoskeletal: He exhibits no edema or deformity.   Neurological: He is alert and oriented to person, place, and time.   Skin: Skin is warm and dry. He is not diaphoretic. No erythema.   Psychiatric: He has a normal mood and affect. His behavior is normal. Judgment and thought content normal.       Significant Labs: All pertinent labs within the past 24 hours have been reviewed.    Significant Imaging: I have reviewed all pertinent imaging results/findings within the past 24 hours.

## 2019-07-18 NOTE — PROGRESS NOTES
[facility] Hospital Medicine  Progress Note    Patient Name: Jonah Gordon  MRN: [id number]  Patient Class: IP- Inpatient   Admission Date: 7/17/2019  Length of Stay: 1 days  Attending Physician: Alicia Ribeiro MD  Primary Care Provider: Primary Doctor [facility] Medicine Team: [facility]OSP MED  Alicia Baxter MD    Subjective:     Principal Problem:Hypertensive emergency      HPI:  Jonah Gordon is a 49 y.o. man with a history of HTN and Morbid Obesity who [place] [facility] [place] of acute onset left-sided chest pain.     Patient reports that earlier this afternoon he experienced sudden onset left-sided chest pain with exertion while walking from his home to his car.  He describes the pain as sharp and pressure-like with no radiation, 8/10 in intensity.  He denies associated shortness of breath, headache, and myalgias.  The pain was nonreproducible.  He denies having chest pain like this prior to this episode.  He denies recent musculoskeletal tear or chest trauma.  He denies hemoptysis, fevers, chills, recent prolonged travel or prolonged immobility, and asymmetric lower extremity swelling. Of note, he has a history of HTN and has been off all of his blood pressure medicines for approximately 10 months.  When asked why he has not been taking any blood pressure medicines he states that he is currently in between primary care physicians.  He denies adherence to a low-salt, fluid-restricted diet and does not check his blood pressure at home on a regular basis.  He is not currently taking any medications scheduled.     In the ED, triage /132.  He was given nitroglycerin spray x3 and EMS as well as 325 aspirin x1.  He was started on a nitroglycerin drip briefly with improvement and MAP to < 118 mm Hg with resolution of his chest pain. Troponin negative x2 and EKG NSR with no acute ischemic changes in contiguous leads.  CXR with no consolidation or evidence of pulmonary  edema.    Overview/Hospital Course:  No notes on file    Interval History: denies CP or SOB. BP slightly better. No complaints.     Review of Systems   Constitutional: Negative.    HENT: Negative.    Respiratory: Negative.    Cardiovascular: Negative.    Gastrointestinal: Negative.    Genitourinary: Negative.    Musculoskeletal: Negative.      Objective:     Vital Signs (Most Recent):  Temp: 97.5 °F (36.4 °C) (07/18/19 0816)  Pulse: 79 (07/18/19 1029)  Resp: (!) 22 (07/18/19 1029)  BP: (!) 183/117 (07/18/19 1029)  SpO2: 98 % (07/18/19 1029) Vital Signs (24h Range):  Temp:  [97.5 °F (36.4 °C)-98.6 °F (37 °C)] 97.5 °F (36.4 °C)  Pulse:  [] 79  Resp:  [11-22] 22  SpO2:  [92 %-100 %] 98 %  BP: (149-210)/() 183/117     Weight: 134 kg (295 lb 6.7 oz)  Body mass index is 38.98 kg/m².  No intake or output data in the 24 hours ending 07/18/19 1032   Physical Exam   Constitutional: He is oriented to person, place, and time. He appears well-developed and well-nourished. No distress.   HENT:   Head: Normocephalic and atraumatic.   Eyes: Pupils are equal, round, and reactive to light. No scleral icterus.   Neck: Normal range of motion.   Cardiovascular: Normal rate, regular rhythm and normal heart sounds.   No murmur heard.  Pulmonary/Chest: Effort normal and breath sounds normal. No respiratory distress. He has no wheezes.   Abdominal: Soft. Bowel sounds are normal. He exhibits no distension. There is no tenderness.   Musculoskeletal: He exhibits no edema or deformity.   Neurological: He is alert and oriented to person, place, and time.   Skin: Skin is warm and dry. He is not diaphoretic. No erythema.   Psychiatric: He has a normal mood and affect. His behavior is normal. Judgment and thought content normal.       Significant Labs: All pertinent labs within the past 24 hours have been reviewed.    Significant Imaging: I have reviewed all pertinent imaging results/findings within the past 24  hours.      Assessment/Plan:      * Hypertensive emergency  BP Readings from Last 3 Encounters:   07/18/19 (!) 170/109     Better controlled  Cont HCTZ and amlodipine to treat  Cont hydralazine PRN  Cardiac diet    CKD (chronic kidney disease) stage 2, GFR 60-89 ml/min  Lab Results   Component Value Date    CREATININE 1.4 07/18/2019    CREATININE 1.4 07/18/2019     Sr Cr stable  Cont to monitor with daily labs   Avoid nephrotoxins.   Renally dose all medications   Monitor urine output       VTE Risk Mitigation (From admission, onward)        Ordered     enoxaparin injection 40 mg  Daily      07/17/19 2207     IP VTE HIGH RISK PATIENT  Once      07/17/19 2207                Alicia Baxter MD  Department of Hospital Medicine   [facility]

## 2019-07-19 VITALS
RESPIRATION RATE: 18 BRPM | DIASTOLIC BLOOD PRESSURE: 72 MMHG | SYSTOLIC BLOOD PRESSURE: 136 MMHG | BODY MASS INDEX: 39.16 KG/M2 | HEIGHT: 73 IN | WEIGHT: 295.44 LBS | OXYGEN SATURATION: 95 % | TEMPERATURE: 98 F | HEART RATE: 73 BPM

## 2019-07-19 LAB
ALBUMIN SERPL BCP-MCNC: 4.1 G/DL (ref 3.5–5.2)
ALP SERPL-CCNC: 70 U/L (ref 55–135)
ALT SERPL W/O P-5'-P-CCNC: 28 U/L (ref 10–44)
ANION GAP SERPL CALC-SCNC: 11 MMOL/L (ref 8–16)
ANION GAP SERPL CALC-SCNC: 11 MMOL/L (ref 8–16)
AST SERPL-CCNC: 18 U/L (ref 10–40)
BASOPHILS # BLD AUTO: 0.04 K/UL (ref 0–0.2)
BASOPHILS NFR BLD: 1.3 % (ref 0–1.9)
BILIRUB SERPL-MCNC: 0.4 MG/DL (ref 0.1–1)
BUN SERPL-MCNC: 16 MG/DL (ref 6–20)
BUN SERPL-MCNC: 16 MG/DL (ref 6–20)
CALCIUM SERPL-MCNC: 9.7 MG/DL (ref 8.7–10.5)
CALCIUM SERPL-MCNC: 9.7 MG/DL (ref 8.7–10.5)
CHLORIDE SERPL-SCNC: 100 MMOL/L (ref 95–110)
CHLORIDE SERPL-SCNC: 100 MMOL/L (ref 95–110)
CO2 SERPL-SCNC: 25 MMOL/L (ref 23–29)
CO2 SERPL-SCNC: 25 MMOL/L (ref 23–29)
CREAT SERPL-MCNC: 1.3 MG/DL (ref 0.5–1.4)
CREAT SERPL-MCNC: 1.3 MG/DL (ref 0.5–1.4)
DIFFERENTIAL METHOD: ABNORMAL
EOSINOPHIL # BLD AUTO: 0.2 K/UL (ref 0–0.5)
EOSINOPHIL NFR BLD: 5.4 % (ref 0–8)
ERYTHROCYTE [DISTWIDTH] IN BLOOD BY AUTOMATED COUNT: 14.1 % (ref 11.5–14.5)
EST. GFR  (AFRICAN AMERICAN): >60 ML/MIN/1.73 M^2
EST. GFR  (AFRICAN AMERICAN): >60 ML/MIN/1.73 M^2
EST. GFR  (NON AFRICAN AMERICAN): >60 ML/MIN/1.73 M^2
EST. GFR  (NON AFRICAN AMERICAN): >60 ML/MIN/1.73 M^2
GLUCOSE SERPL-MCNC: 112 MG/DL (ref 70–110)
GLUCOSE SERPL-MCNC: 112 MG/DL (ref 70–110)
HCT VFR BLD AUTO: 47.5 % (ref 40–54)
HGB BLD-MCNC: 14.8 G/DL (ref 14–18)
IMM GRANULOCYTES # BLD AUTO: 0.01 K/UL (ref 0–0.04)
IMM GRANULOCYTES NFR BLD AUTO: 0.3 % (ref 0–0.5)
LYMPHOCYTES # BLD AUTO: 1.4 K/UL (ref 1–4.8)
LYMPHOCYTES NFR BLD: 43.3 % (ref 18–48)
MAGNESIUM SERPL-MCNC: 2.3 MG/DL (ref 1.6–2.6)
MCH RBC QN AUTO: 28.7 PG (ref 27–31)
MCHC RBC AUTO-ENTMCNC: 31.2 G/DL (ref 32–36)
MCV RBC AUTO: 92 FL (ref 82–98)
MONOCYTES # BLD AUTO: 0.4 K/UL (ref 0.3–1)
MONOCYTES NFR BLD: 12.1 % (ref 4–15)
NEUTROPHILS # BLD AUTO: 1.2 K/UL (ref 1.8–7.7)
NEUTROPHILS NFR BLD: 37.6 % (ref 38–73)
NRBC BLD-RTO: 0 /100 WBC
PLATELET # BLD AUTO: 259 K/UL (ref 150–350)
PMV BLD AUTO: 10.5 FL (ref 9.2–12.9)
POTASSIUM SERPL-SCNC: 3.4 MMOL/L (ref 3.5–5.1)
POTASSIUM SERPL-SCNC: 3.4 MMOL/L (ref 3.5–5.1)
PROT SERPL-MCNC: 7.4 G/DL (ref 6–8.4)
RBC # BLD AUTO: 5.15 M/UL (ref 4.6–6.2)
SODIUM SERPL-SCNC: 136 MMOL/L (ref 136–145)
SODIUM SERPL-SCNC: 136 MMOL/L (ref 136–145)
WBC # BLD AUTO: 3.14 K/UL (ref 3.9–12.7)

## 2019-07-19 PROCEDURE — 80053 COMPREHEN METABOLIC PANEL: CPT

## 2019-07-19 PROCEDURE — 25000003 PHARM REV CODE 250: Performed by: HOSPITALIST

## 2019-07-19 PROCEDURE — 85025 COMPLETE CBC W/AUTO DIFF WBC: CPT

## 2019-07-19 PROCEDURE — 36415 COLL VENOUS BLD VENIPUNCTURE: CPT

## 2019-07-19 PROCEDURE — 99239 PR HOSPITAL DISCHARGE DAY,>30 MIN: ICD-10-PCS | Mod: ,,, | Performed by: HOSPITALIST

## 2019-07-19 PROCEDURE — 99239 HOSP IP/OBS DSCHRG MGMT >30: CPT | Mod: ,,, | Performed by: HOSPITALIST

## 2019-07-19 PROCEDURE — 83735 ASSAY OF MAGNESIUM: CPT

## 2019-07-19 RX ORDER — POTASSIUM CHLORIDE 20 MEQ/1
40 TABLET, EXTENDED RELEASE ORAL ONCE
Status: COMPLETED | OUTPATIENT
Start: 2019-07-19 | End: 2019-07-19

## 2019-07-19 RX ORDER — AMLODIPINE BESYLATE 10 MG/1
10 TABLET ORAL DAILY
Qty: 30 TABLET | Refills: 11 | Status: SHIPPED | OUTPATIENT
Start: 2019-07-19 | End: 2019-07-24 | Stop reason: ALTCHOICE

## 2019-07-19 RX ORDER — HYDROCHLOROTHIAZIDE 25 MG/1
25 TABLET ORAL DAILY
Qty: 30 TABLET | Refills: 11 | Status: SHIPPED | OUTPATIENT
Start: 2019-07-19 | End: 2019-07-24 | Stop reason: ALTCHOICE

## 2019-07-19 RX ADMIN — AMLODIPINE BESYLATE 10 MG: 10 TABLET ORAL at 09:07

## 2019-07-19 RX ADMIN — HYDROCHLOROTHIAZIDE 25 MG: 25 TABLET ORAL at 09:07

## 2019-07-19 RX ADMIN — POTASSIUM CHLORIDE 40 MEQ: 1500 TABLET, EXTENDED RELEASE ORAL at 09:07

## 2019-07-19 NOTE — DISCHARGE INSTRUCTIONS
Record your blood pressures daily.  Take medication as prescribed.  Report prolonged signs and symptoms to MD immediately.  DO NOT MISS YOUR FOLLOW UP APPOINTMENT

## 2019-07-19 NOTE — PLAN OF CARE
Patient discharged home with family.    Future Appointments   Date Time Provider Department Center   7/24/2019  9:00 AM Tayo Chin Jr., MD [facility]        07/19/19 1523   Final Note   Assessment Type Final Discharge Note   Anticipated Discharge Disposition Home   Right Care Referral Info   Post Acute Recommendation No Care

## 2019-07-19 NOTE — ASSESSMENT & PLAN NOTE
Lab Results   Component Value Date    CREATININE 1.3 07/19/2019    CREATININE 1.3 07/19/2019     Sr Cr stable  Cont to monitor with daily labs   Avoid nephrotoxins.   Renally dose all medications   Monitor urine output

## 2019-07-19 NOTE — NURSING
All lines and tele removed prior to discharge, review of medications as well as follow up visits with MD. Pt is being discharged AAOX4, with steady gait accompanied by wife

## 2019-07-19 NOTE — PLAN OF CARE
Problem: Adult Inpatient Plan of Care  Goal: Plan of Care Review  Outcome: Ongoing (interventions implemented as appropriate)  Vitals and assessment done as ordered. Room  clear of clutter .Pain meds given as requested for slight headache, total  relief. Bed in low position and locked call light in reach. Pt free of injury or falls.

## 2019-07-19 NOTE — ASSESSMENT & PLAN NOTE
BP Readings from Last 3 Encounters:   07/19/19 136/72     Resolved  BP controlled  Cont HCTZ and amlodipine to treat. Potassium supplements as needed  Cont hydralazine PRN  Cardiac diet  Follow up with PCP  Advised on BP logs at home. Patient states he has 2 BP machines.

## 2019-07-19 NOTE — DISCHARGE SUMMARY
[facility] Hospital Medicine  Discharge Summary      Patient Name: Jonah Gordon  MRN: [id number]  Admission Date: 7/17/2019  Hospital Length of Stay: 2 days  Discharge Date and Time:  07/19/2019 8:33 AM  Attending Physician: Alicia Ribeiro MD   Discharging Provider: Alicia Baxter MD  Primary Care Provider: Tayo Chin Jr, MD  [facility] Medicine Team: [facility] HOSP MED  Alicia Baxter MD    HPI:   Jonah Gordon is a 49 y.o. man with a history of HTN and Morbid Obesity who presents to [facility] complaini of acute onset left-sided chest pain.     Patient reports that earlier this afternoon he experienced sudden onset left-sided chest pain with exertion while walking from his home to his car.  He describes the pain as sharp and pressure-like with no radiation, 8/10 in intensity.  He denies associated shortness of breath, headache, and myalgias.  The pain was nonreproducible.  He denies having chest pain like this prior to this episode.  He denies recent musculoskeletal tear or chest trauma.  He denies hemoptysis, fevers, chills, recent prolonged travel or prolonged immobility, and asymmetric lower extremity swelling. Of note, he has a history of HTN and has been off all of his blood pressure medicines for approximately 10 months.  When asked why he has not been taking any blood pressure medicines he states that he is currently in between primary care physicians.  He denies adherence to a low-salt, fluid-restricted diet and does not check his blood pressure at home on a regular basis.  He is not currently taking any medications scheduled.     In the ED, triage /132.  He was given nitroglycerin spray x3 and EMS as well as 325 aspirin x1.  He was started on a nitroglycerin drip briefly with improvement and MAP to < 118 mm Hg with resolution of his chest pain. Troponin negative x2 and EKG NSR with no acute ischemic changes in contiguous leads.  CXR with no consolidation or evidence of pulmonary  edema.    * No surgery found *      Hospital Course:   No notes on file     Consults:     * Hypertensive emergency  BP Readings from Last 3 Encounters:   07/19/19 136/72     Resolved  BP controlled  Cont HCTZ and amlodipine to treat. Potassium supplements as needed  Cont hydralazine PRN  Cardiac diet  Follow up with PCP  Advised on BP logs at home. Patient states he has 2 BP machines.     CKD (chronic kidney disease) stage 2, GFR 60-89 ml/min  Lab Results   Component Value Date    CREATININE 1.3 07/19/2019    CREATININE 1.3 07/19/2019     Sr Cr stable  Cont to monitor with daily labs   Avoid nephrotoxins.   Renally dose all medications   Monitor urine output       Final Active Diagnoses:    Diagnosis Date Noted POA    PRINCIPAL PROBLEM:  Hypertensive emergency [I16.1] 07/17/2019 Yes    CKD (chronic kidney disease) stage 2, GFR 60-89 ml/min [N18.2] 07/18/2019 Yes      Problems Resolved During this Admission:       Discharged Condition: good    Disposition: Home or Self Care    Follow Up:  Follow-up Information     Tayo Chin Jr, MD In 1 week.    Specialty:  Family Medicine  Why:  For follow up and review of hosptial course   Contact information:  [address] [place] [id number]  [phone]                 Patient Instructions:      Ambulatory referral to Nephrology   Referral Priority: Routine Referral Type: Consultation   Referral Reason: Specialty Services Required   Requested Specialty: Nephrology   Number of Visits Requested: 1     Diet Cardiac     Notify your health care provider if you experience any of the following:  persistent dizziness, light-headedness, or visual disturbances     Activity as tolerated       Significant Diagnostic Studies: Labs:   BMP:   Recent Labs   Lab 07/17/19  1604 07/18/19  0341 07/19/19  0316    101  101 112*  112*    140  140 136  136   K 3.3* 3.4*  3.4* 3.4*  3.4*    107  107 100  100   CO2 24 24  24 25  25   BUN 14 17  17 16  16    CREATININE 1.3 1.4  1.4 1.3  1.3   CALCIUM 9.3 9.7  9.7 9.7  9.7   MG  --  2.4 2.3    and CBC   Recent Labs   Lab 07/17/19  1604 07/18/19  0341 07/19/19  0316   WBC 3.81* 3.84* 3.14*   HGB 13.3* 13.7* 14.8   HCT 41.9 43.5 47.5    242 259       Pending Diagnostic Studies:     None         Medications:  Reconciled Home Medications:      Medication List      START taking these medications    amLODIPine 10 MG tablet  Commonly known as:  NORVASC  Take 1 tablet (10 mg total) by mouth once daily.     hydroCHLOROthiazide 25 MG tablet  Commonly known as:  HYDRODIURIL  Take 1 tablet (25 mg total) by mouth once daily.            Indwelling Lines/Drains at time of discharge:   Lines/Drains/Airways          None          Time spent on the discharge of patient: 30 minutes  Patient was seen and examined on the date of discharge and determined to be suitable for discharge.         Alicia Baxter MD  Department of Hospital Medicine  [facility]

## 2019-07-24 ENCOUNTER — OFFICE VISIT (OUTPATIENT)
Dept: FAMILY MEDICINE | Facility: CLINIC | Age: 49
End: 2019-07-24
Payer: OTHER GOVERNMENT

## 2019-07-24 VITALS
HEART RATE: 85 BPM | SYSTOLIC BLOOD PRESSURE: 158 MMHG | OXYGEN SATURATION: 98 % | DIASTOLIC BLOOD PRESSURE: 101 MMHG | HEIGHT: 73 IN | WEIGHT: 298.06 LBS | TEMPERATURE: 98 F | RESPIRATION RATE: 18 BRPM | BODY MASS INDEX: 39.5 KG/M2

## 2019-07-24 DIAGNOSIS — R73.03 PREDIABETES: ICD-10-CM

## 2019-07-24 DIAGNOSIS — N50.812 LEFT TESTICULAR PAIN: ICD-10-CM

## 2019-07-24 DIAGNOSIS — Z12.5 SCREENING FOR PROSTATE CANCER: ICD-10-CM

## 2019-07-24 DIAGNOSIS — I10 HYPERTENSION, BENIGN: Primary | ICD-10-CM

## 2019-07-24 PROCEDURE — 99999 PR PBB SHADOW E&M-EST. PATIENT-LVL IV: ICD-10-PCS | Mod: PBBFAC,,, | Performed by: FAMILY MEDICINE

## 2019-07-24 PROCEDURE — 99999 PR PBB SHADOW E&M-EST. PATIENT-LVL IV: CPT | Mod: PBBFAC,,, | Performed by: FAMILY MEDICINE

## 2019-07-24 PROCEDURE — 99204 OFFICE O/P NEW MOD 45 MIN: CPT | Mod: S$PBB,,, | Performed by: FAMILY MEDICINE

## 2019-07-24 PROCEDURE — 99214 OFFICE O/P EST MOD 30 MIN: CPT | Mod: PBBFAC,PN | Performed by: FAMILY MEDICINE

## 2019-07-24 PROCEDURE — 99204 PR OFFICE/OUTPT VISIT, NEW, LEVL IV, 45-59 MIN: ICD-10-PCS | Mod: S$PBB,,, | Performed by: FAMILY MEDICINE

## 2019-07-24 RX ORDER — HYDROCHLOROTHIAZIDE 25 MG/1
TABLET ORAL
COMMUNITY
End: 2019-08-07

## 2019-07-24 RX ORDER — AMLODIPINE BESYLATE 10 MG/1
TABLET ORAL
COMMUNITY
End: 2019-08-07 | Stop reason: SDUPTHER

## 2019-07-24 RX ORDER — METOPROLOL SUCCINATE 50 MG/1
50 TABLET, EXTENDED RELEASE ORAL DAILY
Qty: 30 TABLET | Refills: 0 | Status: SHIPPED | OUTPATIENT
Start: 2019-07-24 | End: 2019-08-07 | Stop reason: SDUPTHER

## 2019-07-24 NOTE — LETTER
July 24, 2019      [facility]  [address], [address]  [place] [address]  Phone: [phone]       Patient: Jonah Gordon   YOB: 1970  Date of Visit: 07/24/2019    To Whom It May Concern:    Ary Gordon  was at [facility] on 07/24/2019. He may return to work 77/24/2019 on with NO restrictions. If you have any questions or concerns, or if I can be of further assistance, please do not hesitate to contact me.    Sincerely,    Caryn Pope MA

## 2019-07-25 ENCOUNTER — HOSPITAL ENCOUNTER (OUTPATIENT)
Dept: RADIOLOGY | Facility: HOSPITAL | Age: 49
Discharge: HOME | End: 2019-07-25
Attending: FAMILY MEDICINE
Payer: OTHER GOVERNMENT

## 2019-07-25 DIAGNOSIS — N50.812 LEFT TESTICULAR PAIN: ICD-10-CM

## 2019-07-25 PROCEDURE — 76870 US EXAM SCROTUM: CPT | Mod: 26,,, | Performed by: RADIOLOGY

## 2019-07-25 PROCEDURE — 76870 US SCROTUM AND TESTICLES: ICD-10-PCS | Mod: 26,,, | Performed by: RADIOLOGY

## 2019-07-25 PROCEDURE — 76870 US EXAM SCROTUM: CPT | Mod: TC

## 2019-07-29 NOTE — PROGRESS NOTES
Routine Office Visit    Patient Name: Jonah Gordon    : 1970  MRN: [id number]    Subjective:  Jonah ROSENBERG is a 49 y.o. male who presents today for:   Chief Complaint   Patient presents with    Hospital Follow Up     for elevated BP    Hypertension    Establish Care     49-year-old male comes in for hospital follow-up after having been found to have an extremely elevated blood pressure.  He was started on amlodipine and hydrochlorothiazide in the hospital.  He reports taking them as prescribed without any side effects.  He denies any headaches, visual changes, palpitations, bloody urine or blood in stools.    The patient does report that for some time now he has been getting on and off left testicular pain. It is worsened when he sits for prolonged periods of time.  He reports no penile discharge.    Past Medical History  Past Medical History:   Diagnosis Date    Hypertension        Past Surgical History  Past Surgical History:   Procedure Laterality Date    WISDOM TOOTH EXTRACTION          Family History  History reviewed. No pertinent family history.    Social History  Social History     Socioeconomic History    Marital status:      Spouse name: Not on file    Number of children: Not on file    Years of education: Not on file    Highest education level: Not on file   Occupational History    Not on file   Social Needs    Financial resource strain: Not on file    Food insecurity:     Worry: Not on file     Inability: Not on file    Transportation needs:     Medical: Not on file     Non-medical: Not on file   Tobacco Use    Smoking status: Never Smoker    Smokeless tobacco: Never Used   Substance and Sexual Activity    Alcohol use: Yes    Drug use: Never    Sexual activity: Not on file   Lifestyle    Physical activity:     Days per week: Not on file     Minutes per session: Not on file    Stress: Not on file   Relationships    Social connections:     Talks on phone: Not on file     Gets  "together: Not on file     Attends Nondenominational service: Not on file     Active member of club or organization: Not on file     Attends meetings of clubs or organizations: Not on file     Relationship status: Not on file   Other Topics Concern    Not on file   Social History Narrative    Not on file       Current Medications  Current Outpatient Medications on File Prior to Visit   Medication Sig Dispense Refill    amLODIPine (NORVASC) 10 MG tablet amlodipine 10 mg tablet      hydroCHLOROthiazide (HYDRODIURIL) 25 MG tablet hydrochlorothiazide 25 mg tablet   TAKE 1 TABLET BY MOUTH EVERY MORNING FOR 30 DAYS       No current facility-administered medications on file prior to visit.        Allergies   Review of patient's allergies indicates:   Allergen Reactions    Ace inhibitors Edema     Angioedema       Review of Systems   Constitutional: Negative for unexpected weight change.   HENT: Negative for ear pain and sore throat.    Eyes: Negative for visual disturbance.   Respiratory: Negative for shortness of breath.    Cardiovascular: Negative for chest pain.   Gastrointestinal: Negative for abdominal pain and blood in stool.   Endocrine: Negative for cold intolerance and heat intolerance.   Genitourinary: Positive for testicular pain. Negative for dysuria, frequency, penile swelling and scrotal swelling.   Skin: Negative for rash.   Neurological: Negative for weakness, numbness and headaches.   Hematological: Negative for adenopathy.   Psychiatric/Behavioral: Negative for suicidal ideas.     BP (!) 158/101 (BP Location: Left arm, Patient Position: Sitting, BP Method: Medium (Automatic))   Pulse 85   Temp 98.1 °F (36.7 °C) (Oral)   Resp 18   Ht 6' 1" (1.854 m)   Wt 135.2 kg (298 lb 1 oz)   SpO2 98%   BMI 39.32 kg/m²     Physical Exam   Constitutional: He is oriented to person, place, and time. He appears well-developed and well-nourished. No distress.   HENT:   Head: Normocephalic and atraumatic.   Right Ear: " Tympanic membrane, external ear and ear canal normal.   Left Ear: Tympanic membrane, external ear and ear canal normal.   Nose: Nose normal.   Mouth/Throat: Oropharynx is clear and moist.   Eyes: Pupils are equal, round, and reactive to light. Conjunctivae and EOM are normal. Right eye exhibits no discharge. Left eye exhibits no discharge.   Neck: Normal range of motion. Neck supple. No tracheal deviation present. No thyromegaly present.   Cardiovascular: Normal rate, regular rhythm, S1 normal, S2 normal, normal heart sounds and intact distal pulses.   No murmur heard.  Pulses:       Radial pulses are 2+ on the right side, and 2+ on the left side.   Pulmonary/Chest: Effort normal and breath sounds normal. No respiratory distress. He has no wheezes. He has no rhonchi. He has no rales.   Abdominal: Soft. Bowel sounds are normal. He exhibits no distension and no mass. There is no tenderness. There is no rigidity, no guarding and no CVA tenderness.   Genitourinary: Testes normal and penis normal. Right testis shows no tenderness. Left testis shows no tenderness.   Lymphadenopathy:     He has no cervical adenopathy.   Neurological: He is alert and oriented to person, place, and time. He has normal strength. He displays no atrophy. No cranial nerve deficit or sensory deficit. He exhibits normal muscle tone.   Reflex Scores:       Patellar reflexes are 2+ on the right side and 2+ on the left side.  Skin: Skin is warm and dry. Capillary refill takes less than 2 seconds. No rash noted. He is not diaphoretic.   Psychiatric: He has a normal mood and affect. His behavior is normal.   Vitals reviewed.        Assessment/Plan:  Jonah ROSENBERG was seen today for hospital follow up, hypertension and establish care.    Diagnoses and all orders for this visit:    Hypertension, benign  -     metoprolol succinate (TOPROL-XL) 50 MG 24 hr tablet; Take 1 tablet (50 mg total) by mouth once daily.  -     Urinalysis; Future  -      Microalbumin/creatinine urine ratio; Future  Patient already on maximum doses of amlodipine and hydrochlorothiazide.  His blood pressure still elevated.  However I discussed with him that blood pressure is brought down gradually to prevent hypoperfusion to the brain.  Risks of that were explained to the patient.  I will add Toprol XL to his regimen and have patient follow up in two weeks.    Prediabetes  Reviewing the patient's labs show that he is prediabetic.  Explained to him what this meant.  Discussed implications on prediabetes and risk of progression to diabetes.  Dietary changes discussed and encouraged- decreased carb intake- sweets, breads, rice, pasta, potato. Also advised to stop soft drinks.  Also encouraged lifestyle changes- increased physical activity    Left testicular pain  -     PSA, Screening; Future  -     US Scrotum And Testicles; Future  Will check PSA and ultrasound of the testicles.    Screening for prostate cancer  -     PSA, Screening; Future                -Tayo Chin Jr., MD, [facility]          This office note has been dictated.  This dictation has been generated using M-Modal Fluency Direct dictation; some phonetic errors may occur.

## 2019-08-07 ENCOUNTER — OFFICE VISIT (OUTPATIENT)
Dept: FAMILY MEDICINE | Facility: CLINIC | Age: 49
End: 2019-08-07
Payer: OTHER GOVERNMENT

## 2019-08-07 VITALS
RESPIRATION RATE: 17 BRPM | SYSTOLIC BLOOD PRESSURE: 148 MMHG | DIASTOLIC BLOOD PRESSURE: 95 MMHG | HEART RATE: 62 BPM | HEIGHT: 73 IN | OXYGEN SATURATION: 97 % | WEIGHT: 297.38 LBS | BODY MASS INDEX: 39.41 KG/M2 | TEMPERATURE: 98 F

## 2019-08-07 DIAGNOSIS — N50.3 EPIDIDYMAL CYST: ICD-10-CM

## 2019-08-07 DIAGNOSIS — R73.03 PREDIABETES: ICD-10-CM

## 2019-08-07 DIAGNOSIS — I10 HYPERTENSION, BENIGN: Primary | ICD-10-CM

## 2019-08-07 DIAGNOSIS — N50.812 LEFT TESTICULAR PAIN: ICD-10-CM

## 2019-08-07 PROCEDURE — 99214 OFFICE O/P EST MOD 30 MIN: CPT | Mod: S$PBB,,, | Performed by: FAMILY MEDICINE

## 2019-08-07 PROCEDURE — 99999 PR PBB SHADOW E&M-EST. PATIENT-LVL IV: CPT | Mod: PBBFAC,,, | Performed by: FAMILY MEDICINE

## 2019-08-07 PROCEDURE — 99214 PR OFFICE/OUTPT VISIT, EST, LEVL IV, 30-39 MIN: ICD-10-PCS | Mod: S$PBB,,, | Performed by: FAMILY MEDICINE

## 2019-08-07 PROCEDURE — 99214 OFFICE O/P EST MOD 30 MIN: CPT | Mod: PBBFAC,PN | Performed by: FAMILY MEDICINE

## 2019-08-07 PROCEDURE — 99999 PR PBB SHADOW E&M-EST. PATIENT-LVL IV: ICD-10-PCS | Mod: PBBFAC,,, | Performed by: FAMILY MEDICINE

## 2019-08-07 RX ORDER — METOPROLOL SUCCINATE 50 MG/1
50 TABLET, EXTENDED RELEASE ORAL DAILY
Qty: 90 TABLET | Refills: 0 | Status: SHIPPED | OUTPATIENT
Start: 2019-08-07 | End: 2019-09-13

## 2019-08-07 RX ORDER — CHLORTHALIDONE 25 MG/1
25 TABLET ORAL DAILY
Qty: 90 TABLET | Refills: 0 | Status: SHIPPED | OUTPATIENT
Start: 2019-08-07 | End: 2019-11-25 | Stop reason: SDUPTHER

## 2019-08-07 RX ORDER — AMLODIPINE BESYLATE 10 MG/1
10 TABLET ORAL DAILY
Qty: 90 TABLET | Refills: 0 | Status: SHIPPED | OUTPATIENT
Start: 2019-08-07 | End: 2019-11-25 | Stop reason: SDUPTHER

## 2019-08-07 NOTE — PROGRESS NOTES
Routine Office Visit    Patient Name: Jonah Gordon    : 1970  MRN: [id number]    Subjective:  Jonah ROSENBERG is a 49 y.o. male who presents today for:   Chief Complaint   Patient presents with    Hypertension    Follow-up     49-year-old male comes in for follow-up on hypertension.  He is currently taking amlodipine 10 milligrams hydrochlorothiazide 25 milligrams and Toprol XL 50 milligrams.  The Toprol XL was the last medication was added.  He reports he is tolerating all his medicines well.  He reports no side effects from them.  He reports that overall he is also feeling better.  He states that because of pre diabetes and his weight he has been working on his diet.  He has dramatically decreased the amount of carbohydrates including bread [place].  He states that he is gradually getting back into working out.  He had an ultrasound done of the testicle because of testicular pain. This did show an epididymal cyst.  The he reports that he still gets the testicular pain the left side a about once a week.  It is a dull ache.    Past Medical History  Past Medical History:   Diagnosis Date    Hypertension        Past Surgical History  Past Surgical History:   Procedure Laterality Date    WISDOM TOOTH EXTRACTION          Family History  History reviewed. No pertinent family history.    Social History  Social History     Socioeconomic History    Marital status:      Spouse name: Not on file    Number of children: Not on file    Years of education: Not on file    Highest education level: Not on file   Occupational History    Not on file   Social Needs    Financial resource strain: Not on file    Food insecurity:     Worry: Not on file     Inability: Not on file    Transportation needs:     Medical: Not on file     Non-medical: Not on file   Tobacco Use    Smoking status: Never Smoker    Smokeless tobacco: Never Used   Substance and Sexual Activity    Alcohol use: Yes    Drug use: Never    Sexual  "activity: Not on file   Lifestyle    Physical activity:     Days per week: Not on file     Minutes per session: Not on file    Stress: Not on file   Relationships    Social connections:     Talks on phone: Not on file     Gets together: Not on file     Attends Evangelical service: Not on file     Active member of club or organization: Not on file     Attends meetings of clubs or organizations: Not on file     Relationship status: Not on file   Other Topics Concern    Not on file   Social History Narrative    Not on file       Current Medications  Current Outpatient Medications on File Prior to Visit   Medication Sig Dispense Refill    [DISCONTINUED] amLODIPine (NORVASC) 10 MG tablet amlodipine 10 mg tablet      [DISCONTINUED] hydroCHLOROthiazide (HYDRODIURIL) 25 MG tablet hydrochlorothiazide 25 mg tablet   TAKE 1 TABLET BY MOUTH EVERY MORNING FOR 30 DAYS      [DISCONTINUED] metoprolol succinate (TOPROL-XL) 50 MG 24 hr tablet Take 1 tablet (50 mg total) by mouth once daily. 30 tablet 0     No current facility-administered medications on file prior to visit.        Allergies   Review of patient's allergies indicates:   Allergen Reactions    Ace inhibitors Edema     Angioedema       Review of Systems   Respiratory: Negative for shortness of breath and wheezing.    Cardiovascular: Negative for chest pain and palpitations.   Gastrointestinal: Negative for abdominal pain.   Genitourinary: Negative for dysuria, hematuria and penile pain.   Neurological: Negative for dizziness, light-headedness and headaches.     BP (!) 148/95 (BP Location: Left arm, Patient Position: Sitting, BP Method: Medium (Manual))   Pulse 62   Temp 97.9 °F (36.6 °C) (Oral)   Resp 17   Ht 6' 1" (1.854 m)   Wt 134.9 kg (297 lb 6.4 oz)   SpO2 97%   BMI 39.24 kg/m²     Physical Exam   Constitutional: He is oriented to person, place, and time. He appears well-developed and well-nourished.   HENT:   Head: Normocephalic.   Right Ear: External " ear normal.   Left Ear: External ear normal.   Nose: Nose normal.   Mouth/Throat: No oropharyngeal exudate.   Neck: Normal range of motion. Neck supple. No tracheal deviation present.   Cardiovascular: Normal rate, regular rhythm, normal heart sounds and intact distal pulses.   No murmur heard.  Pulmonary/Chest: Effort normal and breath sounds normal. He has no wheezes. He has no rales.   Abdominal: Soft. Bowel sounds are normal. He exhibits no mass. There is no tenderness. There is no rigidity, no rebound, no guarding and no CVA tenderness.   Musculoskeletal: He exhibits no edema.   Lymphadenopathy:     He has no cervical adenopathy.   Neurological: He is oriented to person, place, and time. He has normal strength. He displays no atrophy. No sensory deficit. Gait normal.   Reflex Scores:       Patellar reflexes are 2+ on the right side and 2+ on the left side.  Skin: He is not diaphoretic.   Vitals reviewed.    Lab Visit on 07/24/2019   Component Date Value Ref Range Status    Specimen UA 07/24/2019 Urine, Clean Catch   Final    Color, UA 07/24/2019 Yellow  Yellow, Straw, Maria[place] Final    Appearance, UA 07/24/2019 Clear  Clear Final    pH, UA 07/24/2019 7.0  5.0 - 8.0 Final    Specific Gravity, UA 07/24/2019 1.020  1.005 - 1.030 Final    Protein, UA 07/24/2019 Negative  Negative Final    Comment: Recommend a 24 hour urine protein or a urine   protein/creatinine ratio if globulin induced proteinuria is  clinically suspected.      Glucose, UA 07/24/2019 Negative  Negative Final    Ketones, UA 07/24/2019 Negative  Negative Final    Bilirubin (UA) 07/24/2019 Negative  Negative Final    Occult Blood UA 07/24/2019 Negative  Negative Final    Nitrite, UA 07/24/2019 Negative  Negative Final    Urobilinogen, UA 07/24/2019 Negative  <2.0 EU/dL Final    Leukocytes, UA 07/24/2019 Negative  Negative Final    Microalbum.,U,Random 07/24/2019 56.0  ug/mL Final    Creatinine, Random Ur 07/24/2019 210.0  23.0 - 375.0  mg/dL Final    Comment: The random urine reference ranges provided were established   for 24 hour urine collections.  No reference ranges exist for  random urine specimens.  Correlate clinically.      Microalb Creat Ratio 07/24/2019 26.7  0.0 - 30.0 ug/mg Final   Lab Visit on 07/24/2019   Component Date Value Ref Range Status    PSA, SCREEN 07/24/2019 2.9  0.00 - 4.00 ng/mL Final    Comment: PSA Expected levels:  Hormonal Therapy: <0.05 ng/ml  Prostatectomy: <0.01 ng/ml  Radiation Therapy: <1.00 ng/ml     Admission on 07/17/2019, Discharged on 07/19/2019   Component Date Value Ref Range Status    WBC 07/17/2019 3.81* 3.90 - 12.70 K/uL Final    RBC 07/17/2019 4.65  4.60 - 6.20 M/uL Final    Hemoglobin 07/17/2019 13.3* 14.0 - 18.0 g/dL Final    Hematocrit 07/17/2019 41.9  40.0 - 54.0 % Final    Mean Corpuscular Volume 07/17/2019 90  82 - 98 fL Final    Mean Corpuscular Hemoglobin 07/17/2019 28.6  27.0 - 31.0 pg Final    Mean Corpuscular Hemoglobin Conc 07/17/2019 31.7* 32.0 - 36.0 g/dL Final    RDW 07/17/2019 14.6* 11.5 - 14.5 % Final    Platelets 07/17/2019 242  150 - 350 K/uL Final    MPV 07/17/2019 10.7  9.2 - 12.9 fL Final    Immature Granulocytes 07/17/2019 0.3  0.0 - 0.5 % Final    Gran # (ANC) 07/17/2019 1.6* 1.8 - 7.7 K/uL Final    Immature Grans (Abs) 07/17/2019 0.01  0.00 - 0.04 K/uL Final    Comment: Mild elevation in immature granulocytes is non specific and   can be seen in a variety of conditions including stress response,   acute inflammation, trauma and pregnancy. Correlation with other   laboratory and clinical findings is essential.      Lymph # 07/17/2019 1.6  1.0 - 4.8 K/uL Final    Mono # 07/17/2019 0.5  0.3 - 1.0 K/uL Final    Eos # 07/17/2019 0.1  0.0 - 0.5 K/uL Final    Baso # 07/17/2019 0.04  0.00 - 0.20 K/uL Final    nRBC 07/17/2019 0  0 /100 WBC Final    Gran% 07/17/2019 41.0  38.0 - 73.0 % Final    Lymph% 07/17/2019 41.2  18.0 - 48.0 % Final    Mono% 07/17/2019 13.4   4.0 - 15.0 % Final    Eosinophil% 07/17/2019 3.1  0.0 - 8.0 % Final    Basophil% 07/17/2019 1.0  0.0 - 1.9 % Final    Differential Method 07/17/2019 Automated   Final    Sodium 07/17/2019 142  136 - 145 mmol/L Final    Potassium 07/17/2019 3.3* 3.5 - 5.1 mmol/L Final    Chloride 07/17/2019 108  95 - 110 mmol/L Final    CO2 07/17/2019 24  23 - 29 mmol/L Final    Glucose 07/17/2019 100  70 - 110 mg/dL Final    BUN, Bld 07/17/2019 14  6 - 20 mg/dL Final    Creatinine 07/17/2019 1.3  0.5 - 1.4 mg/dL Final    Calcium 07/17/2019 9.3  8.7 - 10.5 mg/dL Final    Total Protein 07/17/2019 7.0  6.0 - 8.4 g/dL Final    Albumin 07/17/2019 3.9  3.5 - 5.2 g/dL Final    Total Bilirubin 07/17/2019 0.3  0.1 - 1.0 mg/dL Final    Comment: For infants and newborns, interpretation of results should be based  on gestational age, weight and in agreement with clinical  observations.  Premature Infant recommended reference ranges:  Up to 24 hours.............<8.0 mg/dL  Up to 48 hours............<12.0 mg/dL  3-5 days..................<15.0 mg/dL  6-29 days.................<15.0 mg/dL      Alkaline Phosphatase 07/17/2019 64  55 - 135 U/L Final    AST 07/17/2019 19  10 - 40 U/L Final    ALT 07/17/2019 28  10 - 44 U/L Final    Anion Gap 07/17/2019 10  8 - 16 mmol/L Final    eGFR if African American 07/17/2019 >60.0  >60 mL/min/1.73 m^2 Final    eGFR if non African American 07/17/2019 >60.0  >60 mL/min/1.73 m^2 Final    Comment: Calculation used to obtain the estimated glomerular filtration  rate (eGFR) is the CKD-EPI equation.       Troponin I 07/17/2019 0.024  0.000 - 0.026 ng/mL Final    Comment: The reference interval for Troponin I represents the 99th percentile   cutoff   for our facility and is consistent with 3rd generation assay   performance.      Troponin I 07/17/2019 0.025  0.000 - 0.026 ng/mL Final    Comment: The reference interval for Troponin I represents the 99th percentile   cutoff   for our facility and is  consistent with 3rd generation assay   performance.      BNP 07/17/2019 14  0 - 99 pg/mL Final    Values of less than 100 pg/ml are consistent with non-CHF populations.    Sodium 07/18/2019 140  136 - 145 mmol/L Final    Potassium 07/18/2019 3.4* 3.5 - 5.1 mmol/L Final    Chloride 07/18/2019 107  95 - 110 mmol/L Final    CO2 07/18/2019 24  23 - 29 mmol/L Final    Glucose 07/18/2019 101  70 - 110 mg/dL Final    BUN, Bld 07/18/2019 17  6 - 20 mg/dL Final    Creatinine 07/18/2019 1.4  0.5 - 1.4 mg/dL Final    Calcium 07/18/2019 9.7  8.7 - 10.5 mg/dL Final    Anion Gap 07/18/2019 9  8 - 16 mmol/L Final    eGFR if African American 07/18/2019 >60.0  >60 mL/min/1.73 m^2 Final    eGFR if non African American 07/18/2019 59.0* >60 mL/min/1.73 m^2 Final    Comment: Calculation used to obtain the estimated glomerular filtration  rate (eGFR) is the CKD-EPI equation.       Sodium 07/18/2019 140  136 - 145 mmol/L Final    Potassium 07/18/2019 3.4* 3.5 - 5.1 mmol/L Final    Chloride 07/18/2019 107  95 - 110 mmol/L Final    CO2 07/18/2019 24  23 - 29 mmol/L Final    Glucose 07/18/2019 101  70 - 110 mg/dL Final    BUN, Bld 07/18/2019 17  6 - 20 mg/dL Final    Creatinine 07/18/2019 1.4  0.5 - 1.4 mg/dL Final    Calcium 07/18/2019 9.7  8.7 - 10.5 mg/dL Final    Total Protein 07/18/2019 7.1  6.0 - 8.4 g/dL Final    Albumin 07/18/2019 3.9  3.5 - 5.2 g/dL Final    Total Bilirubin 07/18/2019 0.3  0.1 - 1.0 mg/dL Final    Comment: For infants and newborns, interpretation of results should be based  on gestational age, weight and in agreement with clinical  observations.  Premature Infant recommended reference ranges:  Up to 24 hours.............<8.0 mg/dL  Up to 48 hours............<12.0 mg/dL  3-5 days..................<15.0 mg/dL  6-29 days.................<15.0 mg/dL      Alkaline Phosphatase 07/18/2019 65  55 - 135 U/L Final    AST 07/18/2019 18  10 - 40 U/L Final    ALT 07/18/2019 27  10 - 44 U/L Final     Anion Gap 07/18/2019 9  8 - 16 mmol/L Final    eGFR if African American 07/18/2019 >60.0  >60 mL/min/1.73 m^2 Final    eGFR if non African American 07/18/2019 59.0* >60 mL/min/1.73 m^2 Final    Comment: Calculation used to obtain the estimated glomerular filtration  rate (eGFR) is the CKD-EPI equation.       Magnesium 07/18/2019 2.4  1.6 - 2.6 mg/dL Final    WBC 07/18/2019 3.84* 3.90 - 12.70 K/uL Final    RBC 07/18/2019 4.79  4.60 - 6.20 M/uL Final    Hemoglobin 07/18/2019 13.7* 14.0 - 18.0 g/dL Final    Hematocrit 07/18/2019 43.5  40.0 - 54.0 % Final    Mean Corpuscular Volume 07/18/2019 91  82 - 98 fL Final    Mean Corpuscular Hemoglobin 07/18/2019 28.6  27.0 - 31.0 pg Final    Mean Corpuscular Hemoglobin Conc 07/18/2019 31.5* 32.0 - 36.0 g/dL Final    RDW 07/18/2019 14.6* 11.5 - 14.5 % Final    Platelets 07/18/2019 242  150 - 350 K/uL Final    MPV 07/18/2019 10.3  9.2 - 12.9 fL Final    Immature Granulocytes 07/18/2019 0.3  0.0 - 0.5 % Final    Gran # (ANC) 07/18/2019 1.5* 1.8 - 7.7 K/uL Final    Immature Grans (Abs) 07/18/2019 0.01  0.00 - 0.04 K/uL Final    Comment: Mild elevation in immature granulocytes is non specific and   can be seen in a variety of conditions including stress response,   acute inflammation, trauma and pregnancy. Correlation with other   laboratory and clinical findings is essential.      Lymph # 07/18/2019 1.5  1.0 - 4.8 K/uL Final    Mono # 07/18/2019 0.6  0.3 - 1.0 K/uL Final    Eos # 07/18/2019 0.2  0.0 - 0.5 K/uL Final    Baso # 07/18/2019 0.04  0.00 - 0.20 K/uL Final    nRBC 07/18/2019 0  0 /100 WBC Final    Gran% 07/18/2019 39.6  38.0 - 73.0 % Final    Lymph% 07/18/2019 39.6  18.0 - 48.0 % Final    Mono% 07/18/2019 14.8  4.0 - 15.0 % Final    Eosinophil% 07/18/2019 4.7  0.0 - 8.0 % Final    Basophil% 07/18/2019 1.0  0.0 - 1.9 % Final    Differential Method 07/18/2019 Automated   Final    Hemoglobin A1C 07/18/2019 6.2* 4.0 - 5.6 % Final    Comment: ADA  Screening Guidelines:  5.7-6.4%  Consistent with prediabetes  >or=6.5%  Consistent with diabetes  High levels of fetal hemoglobin interfere with the HbA1C  assay. Heterozygous hemoglobin variants (HbS, HgC, etc)do  not significantly interfere with this assay.   However, presence of multiple variants may affect accuracy.      Estimated Avg Glucose 07/18/2019 131  68 - 131 mg/dL Final    TSH 07/18/2019 2.707  0.400 - 4.000 uIU/mL Final    Cholesterol 07/18/2019 183  120 - 199 mg/dL Final    Comment: The National Cholesterol Education Program (NCEP) has set the  following guidelines (reference ranges) for Cholesterol:  Optimal.....................<200 mg/dL  Borderline High.............200-239 mg/dL  High........................> or = 240 mg/dL      Triglycerides 07/18/2019 125  30 - 150 mg/dL Final    Comment: The National Cholesterol Education Program (NCEP) has set the  following guidelines (reference values) for triglycerides:  Normal......................<150 mg/dL  Borderline High.............150-199 mg/dL  High........................200-499 mg/dL      HDL 07/18/2019 42  40 - 75 mg/dL Final    Comment: The National Cholesterol Education Program (NCEP) has set the  following guidelines (reference values) for HDL Cholesterol:  Low...............<40 mg/dL  Optimal...........>60 mg/dL      LDL Cholesterol 07/18/2019 116.0  63.0 - 159.0 mg/dL Final    Comment: The National Cholesterol Education Program (NCEP) has set the  following guidelines (reference values) for LDL Cholesterol:  Optimal.......................<130 mg/dL  Borderline High...............130-159 mg/dL  High..........................160-189 mg/dL  Very High.....................>190 mg/dL      Hdl/Cholesterol Ratio 07/18/2019 23.0  20.0 - 50.0 % Final    Total Cholesterol/HDL Ratio 07/18/2019 4.4  2.0 - 5.0 Final    Non-HDL Cholesterol 07/18/2019 141  mg/dL Final    Comment: Risk category and Non-HDL cholesterol goals:  Coronary heart disease  (CHD)or equivalent (10-year risk of CHD >20%):  Non-HDL cholesterol goal     <130 mg/dL  Two or more CHD risk factors and 10-year risk of CHD <= 20%:  Non-HDL cholesterol goal     <160 mg/dL  0 to 1 CHD risk factor:  Non-HDL cholesterol goal     <190 mg/dL      Sodium 07/19/2019 136  136 - 145 mmol/L Final    Potassium 07/19/2019 3.4* 3.5 - 5.1 mmol/L Final    Chloride 07/19/2019 100  95 - 110 mmol/L Final    CO2 07/19/2019 25  23 - 29 mmol/L Final    Glucose 07/19/2019 112* 70 - 110 mg/dL Final    BUN, Bld 07/19/2019 16  6 - 20 mg/dL Final    Creatinine 07/19/2019 1.3  0.5 - 1.4 mg/dL Final    Calcium 07/19/2019 9.7  8.7 - 10.5 mg/dL Final    Anion Gap 07/19/2019 11  8 - 16 mmol/L Final    eGFR if African American 07/19/2019 >60.0  >60 mL/min/1.73 m^2 Final    eGFR if non African American 07/19/2019 >60.0  >60 mL/min/1.73 m^2 Final    Comment: Calculation used to obtain the estimated glomerular filtration  rate (eGFR) is the CKD-EPI equation.       Sodium 07/19/2019 136  136 - 145 mmol/L Final    Potassium 07/19/2019 3.4* 3.5 - 5.1 mmol/L Final    Chloride 07/19/2019 100  95 - 110 mmol/L Final    CO2 07/19/2019 25  23 - 29 mmol/L Final    Glucose 07/19/2019 112* 70 - 110 mg/dL Final    BUN, Bld 07/19/2019 16  6 - 20 mg/dL Final    Creatinine 07/19/2019 1.3  0.5 - 1.4 mg/dL Final    Calcium 07/19/2019 9.7  8.7 - 10.5 mg/dL Final    Total Protein 07/19/2019 7.4  6.0 - 8.4 g/dL Final    Albumin 07/19/2019 4.1  3.5 - 5.2 g/dL Final    Total Bilirubin 07/19/2019 0.4  0.1 - 1.0 mg/dL Final    Comment: For infants and newborns, interpretation of results should be based  on gestational age, weight and in agreement with clinical  observations.  Premature Infant recommended reference ranges:  Up to 24 hours.............<8.0 mg/dL  Up to 48 hours............<12.0 mg/dL  3-5 days..................<15.0 mg/dL  6-29 days.................<15.0 mg/dL      Alkaline Phosphatase 07/19/2019 70  55 - 135 U/L  Final    AST 07/19/2019 18  10 - 40 U/L Final    ALT 07/19/2019 28  10 - 44 U/L Final    Anion Gap 07/19/2019 11  8 - 16 mmol/L Final    eGFR if African American 07/19/2019 >60.0  >60 mL/min/1.73 m^2 Final    eGFR if non African American 07/19/2019 >60.0  >60 mL/min/1.73 m^2 Final    Comment: Calculation used to obtain the estimated glomerular filtration  rate (eGFR) is the CKD-EPI equation.       Magnesium 07/19/2019 2.3  1.6 - 2.6 mg/dL Final    WBC 07/19/2019 3.14* 3.90 - 12.70 K/uL Final    RBC 07/19/2019 5.15  4.60 - 6.20 M/uL Final    Hemoglobin 07/19/2019 14.8  14.0 - 18.0 g/dL Final    Hematocrit 07/19/2019 47.5  40.0 - 54.0 % Final    Mean Corpuscular Volume 07/19/2019 92  82 - 98 fL Final    Mean Corpuscular Hemoglobin 07/19/2019 28.7  27.0 - 31.0 pg Final    Mean Corpuscular Hemoglobin Conc 07/19/2019 31.2* 32.0 - 36.0 g/dL Final    RDW 07/19/2019 14.1  11.5 - 14.5 % Final    Platelets 07/19/2019 259  150 - 350 K/uL Final    MPV 07/19/2019 10.5  9.2 - 12.9 fL Final    Immature Granulocytes 07/19/2019 0.3  0.0 - 0.5 % Final    Gran # (ANC) 07/19/2019 1.2* 1.8 - 7.7 K/uL Final    Immature Grans (Abs) 07/19/2019 0.01  0.00 - 0.04 K/uL Final    Comment: Mild elevation in immature granulocytes is non specific and   can be seen in a variety of conditions including stress response,   acute inflammation, trauma and pregnancy. Correlation with other   laboratory and clinical findings is essential.      Lymph # 07/19/2019 1.4  1.0 - 4.8 K/uL Final    Mono # 07/19/2019 0.4  0.3 - 1.0 K/uL Final    Eos # 07/19/2019 0.2  0.0 - 0.5 K/uL Final    Baso # 07/19/2019 0.04  0.00 - 0.20 K/uL Final    nRBC 07/19/2019 0  0 /100 WBC Final    Gran% 07/19/2019 37.6* 38.0 - 73.0 % Final    Lymph% 07/19/2019 43.3  18.0 - 48.0 % Final    Mono% 07/19/2019 12.1  4.0 - 15.0 % Final    Eosinophil% 07/19/2019 5.4  0.0 - 8.0 % Final    Basophil% 07/19/2019 1.3  0.0 - 1.9 % Final    Differential Method  07/19/2019 Automated   Final     Narrative     EXAMINATION:  US SCROTUM AND TESTICLES    CLINICAL HISTORY:  Left testicular pain    TECHNIQUE:  Sonography of the scrotum and testes.    COMPARISON:  None.    FINDINGS:  The testicles are symmetric in echogenicity and size.  The right testicle measures 4.2 x 2.4 x 2.9 cm.  The left testicle measures 4.6 x 2.3 x 3.0 cm.  No testicular masses.  Right epididymal head cyst measuring 1.0 cm.  Left epididymis unremarkable..  No hydrocele or varicocele.  Symmetric doppler arterial and venous flow demonstrated to both testicles.      Impression       Small (1.0 cm) right epididymal head cyst.  Otherwise, unremarkable exam.      Electronically signed by: Hakan rC MD  Date: 07/25/2019  Time: 16:03         Assessment/Plan:  Jonah ROSENBERG was seen today for hypertension and follow-up.    Diagnoses and all orders for this visit:    Hypertension, benign  -     chlorthalidone (HYGROTEN) 25 MG Tab; Take 1 tablet (25 mg total) by mouth once daily.  -     metoprolol succinate (TOPROL-XL) 50 MG 24 hr tablet; Take 1 tablet (50 mg total) by mouth once daily.  -     amLODIPine (NORVASC) 10 MG tablet; Take 1 tablet (10 mg total) by mouth once daily.  Patient hydrochlorothiazide running out soon.  Discussed with him that I will changes and chlorothiazide chlorthalidone to see if this gives him better blood pressure control.  He is already on the maximum dose amlodipine.  Discussed with him that I would be hesitant on increasing his Toprol as his pulse is already inches the 60s.  I encouraged him to keep up the good work as the blood pressure is overall improved just not yet at goal.    Prediabetes  Patient encouraged to keep making dietary changes as he has been an increasing his physical activity.    Left testicular pain  -     Ambulatory referral to Urology  Will refer to Urology for evaluation    Epididymal cyst  Discussed with him epididymal cyst.  Discussed with the this is likely not the  cause of his pain. Discussed with him that these are for the most part benign              -Tayo Chin Jr., MD, AAHIVS          This office note has been dictated.  This dictation has been generated using M-Modal Fluency Direct dictation; some phonetic errors may occur.

## 2019-08-28 ENCOUNTER — PATIENT OUTREACH (OUTPATIENT)
Dept: ADMINISTRATIVE | Facility: OTHER | Age: 49
End: 2019-08-28

## 2019-09-03 ENCOUNTER — PATIENT OUTREACH (OUTPATIENT)
Dept: ADMINISTRATIVE | Facility: OTHER | Age: 49
End: 2019-09-03

## 2019-09-05 ENCOUNTER — OFFICE VISIT (OUTPATIENT)
Dept: UROLOGY | Facility: CLINIC | Age: 49
End: 2019-09-05
Payer: OTHER GOVERNMENT

## 2019-09-05 VITALS
HEIGHT: 73 IN | SYSTOLIC BLOOD PRESSURE: 141 MMHG | BODY MASS INDEX: 40.19 KG/M2 | WEIGHT: 303.25 LBS | DIASTOLIC BLOOD PRESSURE: 90 MMHG

## 2019-09-05 DIAGNOSIS — R81 GLUCOSURIA: ICD-10-CM

## 2019-09-05 DIAGNOSIS — N52.9 ERECTILE DYSFUNCTION OF ORGANIC ORIGIN: ICD-10-CM

## 2019-09-05 DIAGNOSIS — N50.812 LEFT TESTICULAR PAIN: Primary | ICD-10-CM

## 2019-09-05 LAB
BILIRUB SERPL-MCNC: NORMAL MG/DL
BLOOD URINE, POC: NORMAL
COLOR, POC UA: YELLOW
GLUCOSE UR QL STRIP: 250
KETONES UR QL STRIP: NORMAL
LEUKOCYTE ESTERASE URINE, POC: NORMAL
NITRITE, POC UA: NORMAL
PH, POC UA: 5
PROTEIN, POC: NORMAL
SPECIFIC GRAVITY, POC UA: 1020
UROBILINOGEN, POC UA: NORMAL

## 2019-09-05 PROCEDURE — 99213 OFFICE O/P EST LOW 20 MIN: CPT | Mod: PBBFAC | Performed by: NURSE PRACTITIONER

## 2019-09-05 PROCEDURE — 99999 PR PBB SHADOW E&M-EST. PATIENT-LVL III: ICD-10-PCS | Mod: PBBFAC,,, | Performed by: NURSE PRACTITIONER

## 2019-09-05 PROCEDURE — 99244 OFF/OP CNSLTJ NEW/EST MOD 40: CPT | Mod: S$PBB,,, | Performed by: NURSE PRACTITIONER

## 2019-09-05 PROCEDURE — 81001 URINALYSIS AUTO W/SCOPE: CPT | Mod: PBBFAC | Performed by: NURSE PRACTITIONER

## 2019-09-05 PROCEDURE — 99244 PR OFFICE CONSULTATION,LEVEL IV: ICD-10-PCS | Mod: S$PBB,,, | Performed by: NURSE PRACTITIONER

## 2019-09-05 PROCEDURE — 99999 PR PBB SHADOW E&M-EST. PATIENT-LVL III: CPT | Mod: PBBFAC,,, | Performed by: NURSE PRACTITIONER

## 2019-09-05 RX ORDER — IBUPROFEN 800 MG/1
800 TABLET ORAL 3 TIMES DAILY
Qty: 42 TABLET | Refills: 0 | Status: SHIPPED | OUTPATIENT
Start: 2019-09-05 | End: 2019-09-19

## 2019-09-05 NOTE — PROGRESS NOTES
Subjective:       Patient ID: Jonah Gordon is a 49 y.o. male who is a new patient was referred by Tayo Chin Jr., MD for evaluation of left testicular pain    Chief Complaint:   Chief Complaint   Patient presents with    Other     Pt. states he has a pin in his left testicle and was referred by his PCP.. possibly started 2 months ago maybe 3.. no noticable swelling just pain.. also ED     Left Testicular Pain  Patient presents with c/o left testicular pain x 2-3 months. He describes is pain as a 2/10 dull ache. Pain occurs intermittently. There are no aggravating or alleviating factors. There is no swelling associated with his pain. Denies recent trauma. Denies dysuria or gross hematuria. He has no concerns for STDs    Scrotal ultrasound ordered by PCP. This showed a RIGHT epididymal head cyst, otherwise imaging was unremarkable    Erectile Dysfunction  Patient complains of erectile dysfunction. Onset of dysfunction was a few years ago and was gradual in onset.  Patient states the nature of difficulty is both attaining and maintaining erection. Full erections occur never. Partial erections occur with masturbation and spontaneously. Libido is not affected. Risk factors for ED include cardiovascular disease and beta blocker use. Patient denies history of diabetes mellitus, cranial, spinal, or pelvic trauma, pelvic radiation and hypogonadism. Previous treatment of ED includes none.      ACTIVE MEDICAL ISSUES:  Patient Active Problem List   Diagnosis    Hypertensive emergency    CKD (chronic kidney disease) stage 2, GFR 60-89 ml/min       PAST MEDICAL HISTORY  Past Medical History:   Diagnosis Date    Hypertension        PAST SURGICAL HISTORY:  Past Surgical History:   Procedure Laterality Date    WISDOM TOOTH EXTRACTION         SOCIAL HISTORY:  Social History     Tobacco Use    Smoking status: Never Smoker    Smokeless tobacco: Never Used   Substance Use Topics    Alcohol use: Yes    Drug use: Never  "      FAMILY HISTORY:  History reviewed. No pertinent family history.    ALLERGIES AND MEDICATIONS: updated and reviewed.  Review of patient's allergies indicates:   Allergen Reactions    Ace inhibitors Edema     Angioedema     Current Outpatient Medications   Medication Sig    amLODIPine (NORVASC) 10 MG tablet Take 1 tablet (10 mg total) by mouth once daily.    chlorthalidone (HYGROTEN) 25 MG Tab Take 1 tablet (25 mg total) by mouth once daily.    metoprolol succinate (TOPROL-XL) 50 MG 24 hr tablet Take 1 tablet (50 mg total) by mouth once daily.    ibuprofen (ADVIL,MOTRIN) 800 MG tablet Take 1 tablet (800 mg total) by mouth 3 (three) times daily. for 14 days     No current facility-administered medications for this visit.        Review of Systems   Constitutional: Negative for activity change, chills, fatigue, fever and unexpected weight change.   Eyes: Negative for discharge, redness and visual disturbance.   Respiratory: Negative for cough, shortness of breath and wheezing.    Cardiovascular: Negative for chest pain and leg swelling.   Gastrointestinal: Negative for abdominal distention, abdominal pain, constipation, diarrhea, nausea and vomiting.   Genitourinary: Positive for testicular pain (left sided). Negative for decreased urine volume, difficulty urinating, discharge, dysuria, flank pain, frequency, hematuria, penile pain, penile swelling, scrotal swelling and urgency.   Musculoskeletal: Negative for arthralgias, joint swelling and myalgias.   Skin: Negative for color change and rash.   Neurological: Negative for dizziness and light-headedness.   Psychiatric/Behavioral: Negative for behavioral problems and confusion. The patient is not nervous/anxious.        Objective:      Vitals:    09/05/19 0853   BP: (!) 141/90   Weight: (!) 137.5 kg (303 lb 3.9 oz)   Height: 6' 1" (1.854 m)     Physical Exam   Nursing note and vitals reviewed.  Constitutional: He is oriented to person, place, and time. He " appears well-developed.   HENT:   Head: Normocephalic and atraumatic.   Nose: Nose normal.   Eyes: Conjunctivae are normal. Right eye exhibits no discharge. Left eye exhibits no discharge.   Neck: Normal range of motion. Neck supple. No tracheal deviation present. No thyromegaly present.   Cardiovascular: Normal rate and regular rhythm.    Pulmonary/Chest: Effort normal. No stridor. No respiratory distress. He has no wheezes.   Abdominal: Soft. He exhibits no distension. There is no hepatosplenomegaly. There is no tenderness. There is no CVA tenderness. No hernia. Hernia confirmed negative in the right inguinal area and confirmed negative in the left inguinal area.   Genitourinary: Testes normal and penis normal. Right testis shows no mass, no swelling and no tenderness. Left testis shows no mass, no swelling and no tenderness. Circumcised. No phimosis, penile erythema or penile tenderness. No discharge found.   Musculoskeletal: Normal range of motion. He exhibits no edema.   Neurological: He is alert and oriented to person, place, and time.   Skin: Skin is warm and dry. No rash noted. No erythema.     Psychiatric: He has a normal mood and affect. His behavior is normal. Judgment normal.       Urine dipstick shows 250 glucose.     Narrative     EXAMINATION:  US SCROTUM AND TESTICLES    CLINICAL HISTORY:  Left testicular pain    TECHNIQUE:  Sonography of the scrotum and testes.    COMPARISON:  None.    FINDINGS:  The testicles are symmetric in echogenicity and size.  The right testicle measures 4.2 x 2.4 x 2.9 cm.  The left testicle measures 4.6 x 2.3 x 3.0 cm.  No testicular masses.  Right epididymal head cyst measuring 1.0 cm.  Left epididymis unremarkable..  No hydrocele or varicocele.  Symmetric doppler arterial and venous flow demonstrated to both testicles.   Impression       Small (1.0 cm) right epididymal head cyst.  Otherwise, unremarkable exam.      Electronically signed by: Hakan Cr MD  Date:  07/25/2019  Time: 16:03     Reviewed    Assessment:       1. Left testicular pain    2. Erectile dysfunction of organic origin    3. Glucosuria          Plan:       1. Left testicular pain  -ADA--normal  -PE--normal  - POCT urinalysis, dipstick or tablet reag  - ibuprofen (ADVIL,MOTRIN) 800 MG tablet; Take 1 tablet (800 mg total) by mouth 3 (three) times daily. for 14 days  Dispense: 42 tablet; Refill: 0    2. Erectile dysfunction of organic origin  -He is not inclined to trying any medication at this time  -Monitor    3. Glucosuria  -A1c 7/2019--6.2  -Encouraged tight glucose control  -Defer to PCP            Follow up in about 3 months (around 12/5/2019) for Follow up.

## 2019-09-05 NOTE — LETTER
September 5, 2019      Tayo Chin Jr., MD  [address] [facility] [place] [place] [address]           [facility]  [address] [facility] [address]  [facility] [address]  Phone: [phone]  Fax: [phone]          Patient: Jonah Gordon   MR Number: [id number]   YOB: 1970   Date of Visit: 9/5/2019       Dear Dr. Tayo Chin Jr.:    Thank you for referring Jonah Gordon to me for evaluation. Attached you will find relevant portions of my assessment and plan of care.    If you have questions, please do not hesitate to call me. I look forward to following Jonah Gordon along with you.    Sincerely,    Alena Hernández, ENIO    Enclosure  CC:  No Recipients    If you would like to receive this communication electronically, please contact [email or web address] or [phone] to request more information on [employer] access.    For providers and/or their staff who would like to refer a patient to [facility], please contact us through our one-stop-shop provider referral line, [facility], at [phone].    If you feel you have received this communication in error or would no longer like to receive these types of communications, please e-mail [email or web address]

## 2019-09-13 ENCOUNTER — OFFICE VISIT (OUTPATIENT)
Dept: FAMILY MEDICINE | Facility: CLINIC | Age: 49
End: 2019-09-13
Payer: OTHER GOVERNMENT

## 2019-09-13 VITALS
WEIGHT: 304 LBS | HEIGHT: 73 IN | DIASTOLIC BLOOD PRESSURE: 92 MMHG | HEART RATE: 69 BPM | BODY MASS INDEX: 40.29 KG/M2 | SYSTOLIC BLOOD PRESSURE: 138 MMHG | TEMPERATURE: 99 F | OXYGEN SATURATION: 96 % | RESPIRATION RATE: 17 BRPM

## 2019-09-13 DIAGNOSIS — I10 HYPERTENSION, BENIGN: Primary | ICD-10-CM

## 2019-09-13 DIAGNOSIS — E66.01 CLASS 3 SEVERE OBESITY DUE TO EXCESS CALORIES WITH SERIOUS COMORBIDITY AND BODY MASS INDEX (BMI) OF 40.0 TO 44.9 IN ADULT: ICD-10-CM

## 2019-09-13 DIAGNOSIS — N52.9 ERECTILE DYSFUNCTION, UNSPECIFIED ERECTILE DYSFUNCTION TYPE: ICD-10-CM

## 2019-09-13 DIAGNOSIS — M79.672 LEFT FOOT PAIN: ICD-10-CM

## 2019-09-13 PROCEDURE — 99999 PR PBB SHADOW E&M-EST. PATIENT-LVL III: ICD-10-PCS | Mod: PBBFAC,,, | Performed by: FAMILY MEDICINE

## 2019-09-13 PROCEDURE — 99213 OFFICE O/P EST LOW 20 MIN: CPT | Mod: PBBFAC,PN | Performed by: FAMILY MEDICINE

## 2019-09-13 PROCEDURE — 99214 OFFICE O/P EST MOD 30 MIN: CPT | Mod: S$PBB,,, | Performed by: FAMILY MEDICINE

## 2019-09-13 PROCEDURE — 99214 PR OFFICE/OUTPT VISIT, EST, LEVL IV, 30-39 MIN: ICD-10-PCS | Mod: S$PBB,,, | Performed by: FAMILY MEDICINE

## 2019-09-13 PROCEDURE — 99999 PR PBB SHADOW E&M-EST. PATIENT-LVL III: CPT | Mod: PBBFAC,,, | Performed by: FAMILY MEDICINE

## 2019-09-13 RX ORDER — METOPROLOL SUCCINATE 100 MG/1
100 TABLET, EXTENDED RELEASE ORAL DAILY
Qty: 90 TABLET | Refills: 0 | Status: SHIPPED | OUTPATIENT
Start: 2019-09-13 | End: 2019-11-25

## 2019-09-13 NOTE — PROGRESS NOTES
Routine Office Visit    Patient Name: Jonah Gordon    : 1970  MRN: [id number]    Subjective:  Jonah is a 49 y.o. male who presents today for:   Chief Complaint   Patient presents with    Hypertension     5 wks f/u       49-year-old male comes follow-up on hypertension.  He has been taking his amlodipine 10 milligrams, chlorthalidone 25 milligrams, and Toprol XL 50 milligrams daily.  He reports no side effects from the medications.  He has been taking his blood pressure readings at home the risk of.  His systolic readings have been between 140-150 and his diastolic between the mid 80s to mid 90s.    The patient also reports that he saw the urologist and brought of the rectal dysfunction as she has been having for quite some time.  He was recommended to start on Viagra type medication however he declined.  He states that he has interest in seeing in acupuncture risk for this and then re-evaluate if that does not work.    The patient also reports that several months while walking outside, he thinks he stepped on a because he has been having left foot pain since.  He states that it been gradually getting worse, however since the Urology prescribed ibuprofen this has helped a lot.  He declined seeing a foot doctor for now.    Past Medical History  Past Medical History:   Diagnosis Date    Hypertension        Past Surgical History  Past Surgical History:   Procedure Laterality Date    WISDOM TOOTH EXTRACTION          Family History  History reviewed. No pertinent family history.    Social History  Social History     Socioeconomic History    Marital status:      Spouse name: Not on file    Number of children: Not on file    Years of education: Not on file    Highest education level: Not on file   Occupational History    Not on file   Social Needs    Financial resource strain: Not on file    Food insecurity:     Worry: Not on file     Inability: Not on file    Transportation needs:     Medical: Not  on file     Non-medical: Not on file   Tobacco Use    Smoking status: Never Smoker    Smokeless tobacco: Never Used   Substance and Sexual Activity    Alcohol use: Yes    Drug use: Never    Sexual activity: Not on file   Lifestyle    Physical activity:     Days per week: Not on file     Minutes per session: Not on file    Stress: Not on file   Relationships    Social connections:     Talks on phone: Not on file     Gets together: Not on file     Attends Catholic service: Not on file     Active member of club or organization: Not on file     Attends meetings of clubs or organizations: Not on file     Relationship status: Not on file   Other Topics Concern    Not on file   Social History Narrative    Not on file       Current Medications  Current Outpatient Medications on File Prior to Visit   Medication Sig Dispense Refill    amLODIPine (NORVASC) 10 MG tablet Take 1 tablet (10 mg total) by mouth once daily. 90 tablet 0    chlorthalidone (HYGROTEN) 25 MG Tab Take 1 tablet (25 mg total) by mouth once daily. 90 tablet 0    ibuprofen (ADVIL,MOTRIN) 800 MG tablet Take 1 tablet (800 mg total) by mouth 3 (three) times daily. for 14 days 42 tablet 0    [DISCONTINUED] metoprolol succinate (TOPROL-XL) 50 MG 24 hr tablet Take 1 tablet (50 mg total) by mouth once daily. 90 tablet 0     No current facility-administered medications on file prior to visit.        Allergies   Review of patient's allergies indicates:   Allergen Reactions    Ace inhibitors Edema     Angioedema     Review of Systems   Respiratory: Negative for shortness of breath and wheezing.    Cardiovascular: Negative for chest pain and palpitations.   Gastrointestinal: Negative for abdominal pain.   Genitourinary: Negative for dysuria.   Musculoskeletal: Positive for arthralgias (left foot pain). Negative for gait problem.     BP (!) 138/92 (BP Location: Left arm, Patient Position: Sitting, BP Method: Large (Manual))   Pulse 69   Temp 98.6 °F (37  "°C) (Oral)   Resp 17   Ht 6' 1" (1.854 m)   Wt (!) 137.9 kg (304 lb 0.2 oz)   SpO2 96%   BMI 40.11 kg/m²     Physical Exam   Constitutional: He is oriented to person, place, and time. He appears well-developed and well-nourished.   HENT:   Head: Normocephalic.   Right Ear: External ear normal.   Left Ear: External ear normal.   Nose: Nose normal.   Mouth/Throat: No oropharyngeal exudate.   Neck: Normal range of motion. Neck supple. No tracheal deviation present.   Cardiovascular: Normal rate, regular rhythm, normal heart sounds and intact distal pulses.   No murmur heard.  Pulmonary/Chest: Effort normal and breath sounds normal. He has no wheezes. He has no rales.   Abdominal: Soft. Bowel sounds are normal. He exhibits no mass. There is no tenderness. There is no rigidity, no rebound, no guarding and no CVA tenderness.   Musculoskeletal: He exhibits no edema.   Lymphadenopathy:     He has no cervical adenopathy.   Neurological: He is oriented to person, place, and time.   Skin: He is not diaphoretic.   Vitals reviewed.        Assessment/Plan:  Jonah was seen today for hypertension.    Diagnoses and all orders for this visit:    Hypertension, benign  -     metoprolol succinate (TOPROL-XL) 100 MG 24 hr tablet; Take 1 tablet (100 mg total) by mouth once daily.  Although blood pressures improved, it is still above goal.  Continue chlorthalidone and amlodipine as they are, and will increase Toprol XL to 100 milligrams.  Will recheck blood pressure in 4 to 6 weeks with nurse visit.  Patient encouraged to continue working on his diet and increased physical activity to help with weight loss.    Erectile dysfunction, unspecified erectile dysfunction type  Patient prefers to see acupuncturist before proceeding with any other type of intervention.  He already has 2 acupuncturists in mind.    Left foot pain  Patient declines referral to Podiatry has a the ibuprofen has helped with his pain.  Advised him to send a message if " he needs referral for podiatry if the pain worsens.    Class 3 severe obesity due to excess calories with series comorbidity and body mass index (BMI) of 40.0 to 44.9 in adult  Patient encouraged to keep working on his diet and exercise to help with weight loss as well as his other comorbidities.                -Tayo Chin Jr., MD, AAHIVS          This office note has been dictated.  This dictation has been generated using M-Modal Fluency Direct dictation; some phonetic errors may occur.

## 2019-10-24 ENCOUNTER — TELEPHONE (OUTPATIENT)
Dept: FAMILY MEDICINE | Facility: CLINIC | Age: 49
End: 2019-10-24

## 2019-10-24 NOTE — TELEPHONE ENCOUNTER
No answer, LVM. Please reschedule patient for an appointment.    *PATIENT NEEDS APPOINTMENT RESCHEDULED.

## 2019-10-24 NOTE — TELEPHONE ENCOUNTER
----- Message from George Martel sent at 10/24/2019  7:27 AM CDT -----  Contact: pt   Pt would like a call back regarding rescheduling appointment     Pt can be reached at [phone]

## 2019-11-21 ENCOUNTER — CLINICAL SUPPORT (OUTPATIENT)
Dept: FAMILY MEDICINE | Facility: CLINIC | Age: 49
End: 2019-11-21
Payer: OTHER GOVERNMENT

## 2019-11-21 VITALS — OXYGEN SATURATION: 98 % | DIASTOLIC BLOOD PRESSURE: 80 MMHG | SYSTOLIC BLOOD PRESSURE: 134 MMHG | HEART RATE: 46 BPM

## 2019-11-21 DIAGNOSIS — I10 HYPERTENSION, BENIGN: Primary | ICD-10-CM

## 2019-11-21 PROCEDURE — 99499 UNLISTED E&M SERVICE: CPT | Mod: S$PBB,,, | Performed by: FAMILY MEDICINE

## 2019-11-21 PROCEDURE — 99999 PR PBB SHADOW E&M-EST. PATIENT-LVL II: ICD-10-PCS | Mod: PBBFAC,,,

## 2019-11-21 PROCEDURE — 99212 OFFICE O/P EST SF 10 MIN: CPT | Mod: PBBFAC,PN

## 2019-11-21 PROCEDURE — 99999 PR PBB SHADOW E&M-EST. PATIENT-LVL II: CPT | Mod: PBBFAC,,,

## 2019-11-21 PROCEDURE — 99499 NO LOS: ICD-10-PCS | Mod: S$PBB,,, | Performed by: FAMILY MEDICINE

## 2019-11-21 NOTE — PROGRESS NOTES
.  Jonah Gordon 49 y.o. male is here today for Blood Pressure check.   History of HTN yes.    Review of patient's allergies indicates:   Allergen Reactions    Ace inhibitors Edema     Angioedema     Creatinine   Date Value Ref Range Status   07/19/2019 1.3 0.5 - 1.4 mg/dL Final   07/19/2019 1.3 0.5 - 1.4 mg/dL Final     Sodium   Date Value Ref Range Status   07/19/2019 136 136 - 145 mmol/L Final   07/19/2019 136 136 - 145 mmol/L Final     Potassium   Date Value Ref Range Status   07/19/2019 3.4 (L) 3.5 - 5.1 mmol/L Final   07/19/2019 3.4 (L) 3.5 - 5.1 mmol/L Final   ]  Patient verifies taking blood pressure medications on a regular basis at the same time of the day.     Current Outpatient Medications:     amLODIPine (NORVASC) 10 MG tablet, Take 1 tablet (10 mg total) by mouth once daily., Disp: 90 tablet, Rfl: 0    chlorthalidone (HYGROTEN) 25 MG Tab, Take 1 tablet (25 mg total) by mouth once daily., Disp: 90 tablet, Rfl: 0    metoprolol succinate (TOPROL-XL) 100 MG 24 hr tablet, Take 1 tablet (100 mg total) by mouth once daily., Disp: 90 tablet, Rfl: 0  Does patient have record of home blood pressure readings yes. Readings have been averaging 130/140-90.   Last dose of blood pressure medication was taken at 7:00 AM.  Patient is asymptomatic.   Complains of no complaints.    BP: 134/80 , Pulse: (!) 46 .    Blood pressure reading after 15 minutes was not repeated. Pulse rechecked 51.  Dr. Chin notified. Advised per PCP pt needs appointment. Pt needs refills of Amlodipine and Chlorthalidone. Pt scheduled for 11/25/2019

## 2019-11-25 ENCOUNTER — OFFICE VISIT (OUTPATIENT)
Dept: FAMILY MEDICINE | Facility: CLINIC | Age: 49
End: 2019-11-25
Payer: OTHER GOVERNMENT

## 2019-11-25 VITALS
BODY MASS INDEX: 36.52 KG/M2 | RESPIRATION RATE: 19 BRPM | WEIGHT: 275.56 LBS | OXYGEN SATURATION: 99 % | HEART RATE: 61 BPM | HEIGHT: 73 IN | TEMPERATURE: 98 F | SYSTOLIC BLOOD PRESSURE: 142 MMHG | DIASTOLIC BLOOD PRESSURE: 80 MMHG

## 2019-11-25 DIAGNOSIS — Z23 NEED FOR VACCINATION: ICD-10-CM

## 2019-11-25 DIAGNOSIS — R73.03 PREDIABETES: ICD-10-CM

## 2019-11-25 DIAGNOSIS — I10 HYPERTENSION, BENIGN: Primary | ICD-10-CM

## 2019-11-25 DIAGNOSIS — E66.01 CLASS 2 SEVERE OBESITY DUE TO EXCESS CALORIES WITH SERIOUS COMORBIDITY AND BODY MASS INDEX (BMI) OF 36.0 TO 36.9 IN ADULT: ICD-10-CM

## 2019-11-25 PROCEDURE — 99999 PR PBB SHADOW E&M-EST. PATIENT-LVL III: CPT | Mod: PBBFAC,,, | Performed by: FAMILY MEDICINE

## 2019-11-25 PROCEDURE — 99213 OFFICE O/P EST LOW 20 MIN: CPT | Mod: PBBFAC,PN | Performed by: FAMILY MEDICINE

## 2019-11-25 PROCEDURE — 99214 OFFICE O/P EST MOD 30 MIN: CPT | Mod: S$PBB,,, | Performed by: FAMILY MEDICINE

## 2019-11-25 PROCEDURE — 99999 PR PBB SHADOW E&M-EST. PATIENT-LVL III: ICD-10-PCS | Mod: PBBFAC,,, | Performed by: FAMILY MEDICINE

## 2019-11-25 PROCEDURE — 99214 PR OFFICE/OUTPT VISIT, EST, LEVL IV, 30-39 MIN: ICD-10-PCS | Mod: S$PBB,,, | Performed by: FAMILY MEDICINE

## 2019-11-25 RX ORDER — METOPROLOL TARTRATE 50 MG/1
50 TABLET ORAL 2 TIMES DAILY
Qty: 60 TABLET | Refills: 3 | Status: SHIPPED | OUTPATIENT
Start: 2019-11-25 | End: 2020-03-06 | Stop reason: SDUPTHER

## 2019-11-25 RX ORDER — CHLORTHALIDONE 25 MG/1
25 TABLET ORAL DAILY
Qty: 90 TABLET | Refills: 0 | Status: SHIPPED | OUTPATIENT
Start: 2019-11-25 | End: 2020-01-01 | Stop reason: SDUPTHER

## 2019-11-25 RX ORDER — AMLODIPINE BESYLATE 10 MG/1
10 TABLET ORAL DAILY
Qty: 90 TABLET | Refills: 0 | Status: SHIPPED | OUTPATIENT
Start: 2019-11-25 | End: 2020-03-06 | Stop reason: SDUPTHER

## 2019-11-25 RX ORDER — LORATADINE 10 MG/1
TABLET ORAL
COMMUNITY
End: 2020-07-30

## 2019-11-25 NOTE — PROGRESS NOTES
Routine Office Visit    Patient Name: Jonah Gordon    : 1970  MRN: [id number]    Subjective:  Jonah is a 49 y.o. male who presents today for:   Chief Complaint   Patient presents with    Hypertension    Follow-up       49-year-old with hypertension, prediabetes, morbid obesity comes in for routine follow-up on these.  Reports taking his amlodipine 10 milligrams, chlorthalidone 25 milligrams, and Toprol  milligrams daily.  He reports that he has had some episodes of lightheadedness after starting the metoprolol.  However he feels that overall he is better health than previous.  He has lost over 30 pounds since September, states that he is doing so by improving his diet, having a .  He wants lose another 20 pounds.  Terms of his blood pressure, he would like to try taking half a dose in the morning have the mid to see if his symptoms of lightheadedness resolves.    Past Medical History  Past Medical History:   Diagnosis Date    Hypertension        Past Surgical History  Past Surgical History:   Procedure Laterality Date    WISDOM TOOTH EXTRACTION          Family History  History reviewed. No pertinent family history.    Social History  Social History     Socioeconomic History    Marital status:      Spouse name: Not on file    Number of children: Not on file    Years of education: Not on file    Highest education level: Not on file   Occupational History    Not on file   Social Needs    Financial resource strain: Not on file    Food insecurity:     Worry: Not on file     Inability: Not on file    Transportation needs:     Medical: Not on file     Non-medical: Not on file   Tobacco Use    Smoking status: Never Smoker    Smokeless tobacco: Never Used   Substance and Sexual Activity    Alcohol use: Yes    Drug use: Never    Sexual activity: Not on file   Lifestyle    Physical activity:     Days per week: Not on file     Minutes per session: Not on file    Stress: Not on file  "  Relationships    Social connections:     Talks on phone: Not on file     Gets together: Not on file     Attends Bahai service: Not on file     Active member of club or organization: Not on file     Attends meetings of clubs or organizations: Not on file     Relationship status: Not on file   Other Topics Concern    Not on file   Social History Narrative    Not on file       Current Medications  Current Outpatient Medications on File Prior to Visit   Medication Sig Dispense Refill    [DISCONTINUED] amLODIPine (NORVASC) 10 MG tablet Take 1 tablet (10 mg total) by mouth once daily. 90 tablet 0    [DISCONTINUED] chlorthalidone (HYGROTEN) 25 MG Tab Take 1 tablet (25 mg total) by mouth once daily. 90 tablet 0    [DISCONTINUED] metoprolol succinate (TOPROL-XL) 100 MG 24 hr tablet Take 1 tablet (100 mg total) by mouth once daily. 90 tablet 0    loratadine (CLARITIN) 10 mg tablet loratadine 10 mg tablet       No current facility-administered medications on file prior to visit.        Allergies   Review of patient's allergies indicates:   Allergen Reactions    Ace inhibitors Edema     Angioedema       Review of Systems   Constitutional: Negative for fatigue.   Respiratory: Negative for cough, shortness of breath and wheezing.    Cardiovascular: Negative for chest pain and palpitations.   Gastrointestinal: Negative for abdominal pain and blood in stool.   Genitourinary: Negative for dysuria and hematuria.   Neurological: Positive for light-headedness. Negative for syncope and headaches.     BP (!) 142/80 (BP Location: Left arm, Patient Position: Sitting, BP Method: Medium (Manual))   Pulse 61   Temp 98.3 °F (36.8 °C) (Oral)   Resp 19   Ht 6' 1" (1.854 m)   Wt 125 kg (275 lb 9.2 oz)   SpO2 99%   BMI 36.36 kg/m²     Physical Exam   Constitutional: He is oriented to person, place, and time. He appears well-developed and well-nourished.   HENT:   Head: Normocephalic.   Right Ear: External ear normal.   Left " Ear: External ear normal.   Nose: Nose normal.   Mouth/Throat: No oropharyngeal exudate.   Neck: Normal range of motion. Neck supple. No tracheal deviation present.   Cardiovascular: Normal rate, regular rhythm, normal heart sounds and intact distal pulses.   No murmur heard.  Pulmonary/Chest: Effort normal and breath sounds normal. He has no wheezes. He has no rales.   Abdominal: Soft. Bowel sounds are normal. He exhibits no mass. There is no tenderness. There is no rigidity, no rebound, no guarding and no CVA tenderness.   Musculoskeletal: He exhibits no edema.   Lymphadenopathy:     He has no cervical adenopathy.   Neurological: He is oriented to person, place, and time. He has normal strength. He displays no atrophy. No sensory deficit. Gait normal.   Reflex Scores:       Patellar reflexes are 2+ on the right side and 2+ on the left side.  Skin: He is not diaphoretic.   Vitals reviewed.        Assessment/Plan:  Jonah was seen today for hypertension and follow-up.    Diagnoses and all orders for this visit:    Hypertension, benign  -     chlorthalidone (HYGROTEN) 25 MG Tab; Take 1 tablet (25 mg total) by mouth once daily.  -     amLODIPine (NORVASC) 10 MG tablet; Take 1 tablet (10 mg total) by mouth once daily.  -     metoprolol tartrate (LOPRESSOR) 50 MG tablet; Take 1 tablet (50 mg total) by mouth 2 (two) times daily.  -     Comprehensive metabolic panel; Future  Blood pressure still uncontrolled.  Discussed different options with patient.  Patient would prefer to change to twice daily metoprolol to see if this will help resolve the dizziness get a more stable blood pressure.  Will change Toprol-XL to Lopressor.  Recheck blood pressure in 2 to 3 weeks.  Will have labs done at time of blood pressure check.    Prediabetes  -     Comprehensive metabolic panel; Future  -     Hemoglobin A1c; Future  Recheck A1c.  Patient congratulated on weight loss.  Continue with dietary and lifestyle changes.    Class 2 severe  obesity due to excess calories with serious comorbidity and body mass index (BMI) of 36.0 to 36.9 in adult  -     Comprehensive metabolic panel; Future  Patient congratulated on his weight loss.  He will continue making dietary and lifestyle changes    Need for vaccination  -     Influenza - Quadrivalent (6 months+) (PF); Future  Will have flu vaccine at blood pressure recheck          -Tayo Chin Jr., MD, AAHIVS          This office note has been dictated.  This dictation has been generated using M-Modal Fluency Direct dictation; some phonetic errors may occur.

## 2019-12-05 ENCOUNTER — PATIENT OUTREACH (OUTPATIENT)
Dept: ADMINISTRATIVE | Facility: OTHER | Age: 49
End: 2019-12-05

## 2019-12-15 ENCOUNTER — PATIENT OUTREACH (OUTPATIENT)
Dept: ADMINISTRATIVE | Facility: OTHER | Age: 49
End: 2019-12-15

## 2019-12-18 ENCOUNTER — OFFICE VISIT (OUTPATIENT)
Dept: UROLOGY | Facility: CLINIC | Age: 49
End: 2019-12-18
Payer: OTHER GOVERNMENT

## 2019-12-18 VITALS
RESPIRATION RATE: 16 BRPM | HEART RATE: 74 BPM | BODY MASS INDEX: 36.61 KG/M2 | SYSTOLIC BLOOD PRESSURE: 146 MMHG | HEIGHT: 73 IN | DIASTOLIC BLOOD PRESSURE: 98 MMHG | WEIGHT: 276.25 LBS

## 2019-12-18 DIAGNOSIS — N52.9 ERECTILE DYSFUNCTION OF ORGANIC ORIGIN: ICD-10-CM

## 2019-12-18 DIAGNOSIS — N50.812 LEFT TESTICULAR PAIN: Primary | ICD-10-CM

## 2019-12-18 PROCEDURE — 99999 PR PBB SHADOW E&M-EST. PATIENT-LVL III: CPT | Mod: PBBFAC,,, | Performed by: NURSE PRACTITIONER

## 2019-12-18 PROCEDURE — 99999 PR PBB SHADOW E&M-EST. PATIENT-LVL III: ICD-10-PCS | Mod: PBBFAC,,, | Performed by: NURSE PRACTITIONER

## 2019-12-18 PROCEDURE — 99213 OFFICE O/P EST LOW 20 MIN: CPT | Mod: PBBFAC | Performed by: NURSE PRACTITIONER

## 2019-12-18 PROCEDURE — 99213 OFFICE O/P EST LOW 20 MIN: CPT | Mod: S$PBB,,, | Performed by: NURSE PRACTITIONER

## 2019-12-18 PROCEDURE — 99213 PR OFFICE/OUTPT VISIT, EST, LEVL III, 20-29 MIN: ICD-10-PCS | Mod: S$PBB,,, | Performed by: NURSE PRACTITIONER

## 2019-12-18 NOTE — PROGRESS NOTES
Subjective:       Patient ID: Jonah Gordon is a 49 y.o. male who was last seen in this office 9/5/2019    Chief Complaint:   Chief Complaint   Patient presents with    Follow-up     Patient doing well not having any more pain.       Left Testicular Pain  Patient presents with c/o left testicular pain x 2-3 months. He describes is pain as a 2/10 dull ache. Pain occurs intermittently. There are no aggravating or alleviating factors. There is no swelling associated with his pain. Denies recent trauma. Denies dysuria or gross hematuria. He has no concerns for STDs    Scrotal ultrasound ordered by PCP. This showed a RIGHT epididymal head cyst, otherwise imaging was unremarkable    Erectile Dysfunction  Patient complains of erectile dysfunction. Onset of dysfunction was a few years ago and was gradual in onset.  Patient states the nature of difficulty is both attaining and maintaining erection. Full erections occur never. Partial erections occur with masturbation and spontaneously. Libido is not affected. Risk factors for ED include cardiovascular disease and beta blocker use. Patient denies history of diabetes mellitus, cranial, spinal, or pelvic trauma, pelvic radiation and hypogonadism. Previous treatment of ED includes none.      He is here today for follow up. He tells me that his testicular pain has resolved on its own. He declined treatment for ED at previous visit--still not inclined to starting medications at this time. No new complaints    ACTIVE MEDICAL ISSUES:  Patient Active Problem List   Diagnosis    Hypertensive emergency    CKD (chronic kidney disease) stage 2, GFR 60-89 ml/min       ALLERGIES AND MEDICATIONS: updated and reviewed.  Review of patient's allergies indicates:   Allergen Reactions    Ace inhibitors Edema     Angioedema     Current Outpatient Medications   Medication Sig    amLODIPine (NORVASC) 10 MG tablet Take 1 tablet (10 mg total) by mouth once daily.    chlorthalidone (HYGROTEN)  "25 MG Tab Take 1 tablet (25 mg total) by mouth once daily.    metoprolol tartrate (LOPRESSOR) 50 MG tablet Take 1 tablet (50 mg total) by mouth 2 (two) times daily.    loratadine (CLARITIN) 10 mg tablet loratadine 10 mg tablet     No current facility-administered medications for this visit.        Review of Systems   Constitutional: Negative for activity change, chills, fatigue, fever and unexpected weight change.   Eyes: Negative for discharge, redness and visual disturbance.   Respiratory: Negative for cough, shortness of breath and wheezing.    Cardiovascular: Negative for chest pain and leg swelling.   Gastrointestinal: Negative for abdominal distention, abdominal pain, constipation, diarrhea, nausea and vomiting.   Genitourinary: Negative for decreased urine volume, difficulty urinating, discharge, dysuria, flank pain, frequency, hematuria, penile pain, penile swelling, scrotal swelling, testicular pain and urgency.   Musculoskeletal: Negative for arthralgias, joint swelling and myalgias.   Skin: Negative for color change and rash.   Neurological: Negative for dizziness and light-headedness.   Psychiatric/Behavioral: Negative for behavioral problems and confusion. The patient is not nervous/anxious.        Objective:      Vitals:    12/18/19 1450   BP: (!) 146/98   Pulse: 74   Resp: 16   Weight: 125.3 kg (276 lb 3.8 oz)   Height: 6' 1" (1.854 m)     Physical Exam   Constitutional: He is oriented to person, place, and time. He appears well-developed.   HENT:   Head: Normocephalic and atraumatic.   Nose: Nose normal.   Eyes: Conjunctivae are normal. Right eye exhibits no discharge. Left eye exhibits no discharge.   Neck: Normal range of motion. Neck supple. No tracheal deviation present. No thyromegaly present.   Cardiovascular: Normal rate and regular rhythm.    Pulmonary/Chest: Effort normal. No respiratory distress. He has no wheezes.   Abdominal: Soft. He exhibits no distension. There is no " hepatosplenomegaly. There is no tenderness. There is no CVA tenderness. No hernia.   Genitourinary:   Genitourinary Comments: Patient declined exam   Musculoskeletal: Normal range of motion. He exhibits no edema.   Neurological: He is alert and oriented to person, place, and time.   Skin: Skin is warm and dry. No rash noted. No erythema.     Psychiatric: He has a normal mood and affect. His behavior is normal. Judgment normal.       Urine dipstick shows patient unable to produce specimen.     Assessment:       1. Left testicular pain    2. Erectile dysfunction of organic origin          Plan:       1. Left testicular pain  -Resolved  -ADA and PE--normal previously    2. Erectile dysfunction of organic origin  -He is not inclined to trying any medication at this time  -Monitor              Follow up if symptoms worsen or fail to improve.

## 2019-12-19 ENCOUNTER — LAB VISIT (OUTPATIENT)
Dept: LAB | Facility: HOSPITAL | Age: 49
End: 2019-12-19
Attending: FAMILY MEDICINE
Payer: OTHER GOVERNMENT

## 2019-12-19 ENCOUNTER — CLINICAL SUPPORT (OUTPATIENT)
Dept: FAMILY MEDICINE | Facility: CLINIC | Age: 49
End: 2019-12-19
Payer: OTHER GOVERNMENT

## 2019-12-19 DIAGNOSIS — E66.01 CLASS 2 SEVERE OBESITY DUE TO EXCESS CALORIES WITH SERIOUS COMORBIDITY AND BODY MASS INDEX (BMI) OF 36.0 TO 36.9 IN ADULT: ICD-10-CM

## 2019-12-19 DIAGNOSIS — Z23 NEED FOR VACCINATION: ICD-10-CM

## 2019-12-19 DIAGNOSIS — I10 HYPERTENSION, BENIGN: ICD-10-CM

## 2019-12-19 DIAGNOSIS — R73.03 PREDIABETES: ICD-10-CM

## 2019-12-19 LAB
ALBUMIN SERPL BCP-MCNC: 4 G/DL (ref 3.5–5.2)
ALP SERPL-CCNC: 52 U/L (ref 55–135)
ALT SERPL W/O P-5'-P-CCNC: 19 U/L (ref 10–44)
ANION GAP SERPL CALC-SCNC: 5 MMOL/L (ref 8–16)
AST SERPL-CCNC: 15 U/L (ref 10–40)
BILIRUB SERPL-MCNC: 0.4 MG/DL (ref 0.1–1)
BUN SERPL-MCNC: 15 MG/DL (ref 6–20)
CALCIUM SERPL-MCNC: 9.3 MG/DL (ref 8.7–10.5)
CHLORIDE SERPL-SCNC: 105 MMOL/L (ref 95–110)
CO2 SERPL-SCNC: 32 MMOL/L (ref 23–29)
CREAT SERPL-MCNC: 1.3 MG/DL (ref 0.5–1.4)
EST. GFR  (AFRICAN AMERICAN): >60 ML/MIN/1.73 M^2
EST. GFR  (NON AFRICAN AMERICAN): >60 ML/MIN/1.73 M^2
ESTIMATED AVG GLUCOSE: 120 MG/DL (ref 68–131)
GLUCOSE SERPL-MCNC: 104 MG/DL (ref 70–110)
HBA1C MFR BLD HPLC: 5.8 % (ref 4–5.6)
POTASSIUM SERPL-SCNC: 3.7 MMOL/L (ref 3.5–5.1)
PROT SERPL-MCNC: 7.3 G/DL (ref 6–8.4)
SODIUM SERPL-SCNC: 142 MMOL/L (ref 136–145)

## 2019-12-19 PROCEDURE — 36415 COLL VENOUS BLD VENIPUNCTURE: CPT | Mod: PN

## 2019-12-19 PROCEDURE — 83036 HEMOGLOBIN GLYCOSYLATED A1C: CPT

## 2019-12-19 PROCEDURE — 80053 COMPREHEN METABOLIC PANEL: CPT

## 2019-12-24 ENCOUNTER — CLINICAL SUPPORT (OUTPATIENT)
Dept: FAMILY MEDICINE | Facility: CLINIC | Age: 49
End: 2019-12-24
Payer: OTHER GOVERNMENT

## 2019-12-24 VITALS
DIASTOLIC BLOOD PRESSURE: 77 MMHG | BODY MASS INDEX: 36.44 KG/M2 | SYSTOLIC BLOOD PRESSURE: 147 MMHG | OXYGEN SATURATION: 98 % | TEMPERATURE: 98 F | HEIGHT: 73 IN | HEART RATE: 54 BPM | RESPIRATION RATE: 18 BRPM

## 2019-12-24 DIAGNOSIS — Z23 NEED FOR VACCINATION: ICD-10-CM

## 2019-12-24 DIAGNOSIS — I10 HYPERTENSION, BENIGN: Primary | ICD-10-CM

## 2019-12-24 PROCEDURE — 99213 OFFICE O/P EST LOW 20 MIN: CPT | Mod: PBBFAC,PN,25

## 2019-12-24 PROCEDURE — 99499 NO LOS: ICD-10-PCS | Mod: S$PBB,,, | Performed by: FAMILY MEDICINE

## 2019-12-24 PROCEDURE — 90686 IIV4 VACC NO PRSV 0.5 ML IM: CPT | Mod: PBBFAC,PN

## 2019-12-24 PROCEDURE — 99999 PR PBB SHADOW E&M-EST. PATIENT-LVL III: CPT | Mod: PBBFAC,,,

## 2019-12-24 PROCEDURE — 99999 PR PBB SHADOW E&M-EST. PATIENT-LVL III: ICD-10-PCS | Mod: PBBFAC,,,

## 2019-12-24 PROCEDURE — 99499 UNLISTED E&M SERVICE: CPT | Mod: S$PBB,,, | Performed by: FAMILY MEDICINE

## 2019-12-24 NOTE — PROGRESS NOTES
Jonah Gordon 49 y.o. male is here today for Blood Pressure check.   History of HTN yes.    Review of patient's allergies indicates:   Allergen Reactions    Ace inhibitors Edema     Angioedema     Creatinine   Date Value Ref Range Status   12/19/2019 1.3 0.5 - 1.4 mg/dL Final     Sodium   Date Value Ref Range Status   12/19/2019 142 136 - 145 mmol/L Final     Potassium   Date Value Ref Range Status   12/19/2019 3.7 3.5 - 5.1 mmol/L Final     Patient verifies taking blood pressure medications on a regular basis at the same time of the day. Usually takes every night around 10p.  Took med this AM around 0130.    Current Outpatient Medications:     amLODIPine (NORVASC) 10 MG tablet, Take 1 tablet (10 mg total) by mouth once daily., Disp: 90 tablet, Rfl: 0    chlorthalidone (HYGROTEN) 25 MG Tab, Take 1 tablet (25 mg total) by mouth once daily., Disp: 90 tablet, Rfl: 0    loratadine (CLARITIN) 10 mg tablet, loratadine 10 mg tablet, Disp: , Rfl:     metoprolol tartrate (LOPRESSOR) 50 MG tablet, Take 1 tablet (50 mg total) by mouth 2 (two) times daily., Disp: 60 tablet, Rfl: 3  Does patient have record of home blood pressure readings yes. Readings have been averaging 130s-140s/90s.   Last dose of blood pressure medication was taken at 0130 this AM.  Patient is asymptomatic.   Complains of no pain.    BP: (!) 146/91 , Pulse: (!) 52 .    Blood pressure reading after 15 minutes was 147/77, Pulse 54.  Dr. Chin will be notified.

## 2020-01-01 DIAGNOSIS — I10 HYPERTENSION, BENIGN: ICD-10-CM

## 2020-01-01 RX ORDER — CHLORTHALIDONE 50 MG/1
50 TABLET ORAL DAILY
Qty: 90 TABLET | Refills: 0 | Status: SHIPPED | OUTPATIENT
Start: 2020-01-01 | End: 2020-03-06 | Stop reason: SDUPTHER

## 2020-01-01 NOTE — PROGRESS NOTES
Blood pressure still too high. Increase chlorthalidone to 50 mg and check BP in 2 weeks. Rx for 50 mg sent in.

## 2020-01-02 ENCOUNTER — TELEPHONE (OUTPATIENT)
Dept: FAMILY MEDICINE | Facility: CLINIC | Age: 50
End: 2020-01-02

## 2020-01-02 NOTE — TELEPHONE ENCOUNTER
Call pt regarding BP  advised pt regarding medication and follow up appt staff schedule 2 week Nurse visit for BP check pt verbalized with understanding

## 2020-01-02 NOTE — TELEPHONE ENCOUNTER
----- Message from Tayo Chin Jr., MD sent at 1/1/2020  1:00 PM CST -----      ----- Message -----  From: Latrice Olsen RN  Sent: 12/24/2019   9:15 AM CST  To: Tayo Chin Jr., MD

## 2020-03-06 ENCOUNTER — CLINICAL SUPPORT (OUTPATIENT)
Dept: FAMILY MEDICINE | Facility: CLINIC | Age: 50
End: 2020-03-06
Payer: OTHER GOVERNMENT

## 2020-03-06 ENCOUNTER — TELEPHONE (OUTPATIENT)
Dept: FAMILY MEDICINE | Facility: CLINIC | Age: 50
End: 2020-03-06

## 2020-03-06 VITALS
OXYGEN SATURATION: 97 % | SYSTOLIC BLOOD PRESSURE: 131 MMHG | BODY MASS INDEX: 36.44 KG/M2 | DIASTOLIC BLOOD PRESSURE: 78 MMHG | RESPIRATION RATE: 18 BRPM | HEIGHT: 73 IN | HEART RATE: 62 BPM

## 2020-03-06 DIAGNOSIS — I10 HYPERTENSION, BENIGN: Primary | ICD-10-CM

## 2020-03-06 DIAGNOSIS — I10 HYPERTENSION, BENIGN: ICD-10-CM

## 2020-03-06 PROCEDURE — 99499 NO LOS: ICD-10-PCS | Mod: S$PBB,,, | Performed by: FAMILY MEDICINE

## 2020-03-06 PROCEDURE — 99499 UNLISTED E&M SERVICE: CPT | Mod: S$PBB,,, | Performed by: FAMILY MEDICINE

## 2020-03-06 PROCEDURE — 99999 PR PBB SHADOW E&M-EST. PATIENT-LVL II: ICD-10-PCS | Mod: PBBFAC,,,

## 2020-03-06 PROCEDURE — 99999 PR PBB SHADOW E&M-EST. PATIENT-LVL II: CPT | Mod: PBBFAC,,,

## 2020-03-06 PROCEDURE — 99212 OFFICE O/P EST SF 10 MIN: CPT | Mod: PBBFAC,PN

## 2020-03-06 RX ORDER — AMLODIPINE BESYLATE 10 MG/1
10 TABLET ORAL DAILY
Qty: 90 TABLET | Refills: 3 | Status: SHIPPED | OUTPATIENT
Start: 2020-03-06 | End: 2021-06-30 | Stop reason: SDUPTHER

## 2020-03-06 RX ORDER — CHLORTHALIDONE 50 MG/1
50 TABLET ORAL DAILY
Qty: 90 TABLET | Refills: 3 | Status: SHIPPED | OUTPATIENT
Start: 2020-03-06 | End: 2021-06-30 | Stop reason: SDUPTHER

## 2020-03-06 RX ORDER — METOPROLOL TARTRATE 50 MG/1
50 TABLET ORAL 2 TIMES DAILY
Qty: 180 TABLET | Refills: 3 | Status: SHIPPED | OUTPATIENT
Start: 2020-03-06 | End: 2021-06-30

## 2020-03-06 NOTE — PROGRESS NOTES
.  Jonah Gordon 50 y.o. male is here today for Blood Pressure check.   History of HTN yes.    Review of patient's allergies indicates:   Allergen Reactions    Ace inhibitors Edema     Angioedema     Creatinine   Date Value Ref Range Status   12/19/2019 1.3 0.5 - 1.4 mg/dL Final     Sodium   Date Value Ref Range Status   12/19/2019 142 136 - 145 mmol/L Final     Potassium   Date Value Ref Range Status   12/19/2019 3.7 3.5 - 5.1 mmol/L Final     Patient verifies taking blood pressure medications on a regular basis at the same time of the day.     Current Outpatient Medications:     amLODIPine (NORVASC) 10 MG tablet, Take 1 tablet (10 mg total) by mouth once daily., Disp: 90 tablet, Rfl: 0    chlorthalidone (HYGROTEN) 50 MG Tab, Take 1 tablet (50 mg total) by mouth once daily., Disp: 90 tablet, Rfl: 0    loratadine (CLARITIN) 10 mg tablet, loratadine 10 mg tablet, Disp: , Rfl:     metoprolol tartrate (LOPRESSOR) 50 MG tablet, Take 1 tablet (50 mg total) by mouth 2 (two) times daily., Disp: 60 tablet, Rfl: 3  Does patient have record of home blood pressure readings no. Readings have been averaging ?.   Last dose of blood pressure medication was taken at 0700 this AM.  Patient is asymptomatic.   Complains of no pain.    BP: 131/78 , Pulse: 62 .    Blood pressure reading after 15 minutes was not needed, but pt requesting refills of all BP meds..  Dr. Chin will be notified.

## 2020-04-08 ENCOUNTER — TELEPHONE (OUTPATIENT)
Dept: URGENT CARE | Facility: CLINIC | Age: 50
End: 2020-04-08

## 2020-04-08 ENCOUNTER — NURSE TRIAGE (OUTPATIENT)
Dept: ADMINISTRATIVE | Facility: CLINIC | Age: 50
End: 2020-04-08

## 2020-04-08 DIAGNOSIS — R05.9 COUGH: Primary | ICD-10-CM

## 2020-04-08 NOTE — TELEPHONE ENCOUNTER
"Patient states that he has "dryness in his chest" and cough. Afebrile, no headache or dizziness. HTN. Works for [employer] [place]. Referred to [facility]. Verbalized understanding.     Reason for Disposition   [1] COVID-19 infection diagnosed or suspected AND [2] mild symptoms (fever, cough) AND [3] no trouble breathing or other complications    Additional Information   Negative: SEVERE difficulty breathing (e.g., struggling for each breath, speaks in single words)   Negative: Difficult to awaken or acting confused (e.g., disoriented, slurred speech)   Negative: Bluish (or gray) lips or face now   Negative: Shock suspected (e.g., cold/pale/clammy skin, too weak to stand, low BP, rapid pulse)   Negative: Sounds like a life-threatening emergency to the triager   Negative: SEVERE or constant chest pain (Exception: mild central chest pain, present only when coughing)   Negative: MODERATE difficulty breathing (e.g., speaks in phrases, SOB even at rest, pulse 100-120)   Negative: MILD difficulty breathing (e.g., minimal/no SOB at rest, SOB with walking, pulse <100)   Negative: Chest pain   Negative: Patient sounds very sick or weak to the triager   Negative: Fever > 103 F (39.4 C)   Negative: [1] Fever > 101 F (38.3 C) AND [2] age > 60   Negative: [1] Fever > 100.0 F (37.8 C) AND [2] bedridden (e.g., nursing home patient, CVA, chronic illness, recovering from surgery)   Negative: HIGH RISK patient (e.g., age > 64 years, diabetes, heart or lung disease, weak immune system)   Negative: Fever present > 3 days (72 hours)   Negative: [1] Fever returns after gone for over 24 hours AND [2] symptoms worse or not improved   Negative: [1] Continuous (nonstop) coughing interferes with work or school AND [2] no improvement using cough treatment per protocol   Negative: Cough present > 3 weeks    Protocols used: CORONAVIRUS (COVID-19) DIAGNOSED OR [id number]      "

## 2020-07-30 ENCOUNTER — OFFICE VISIT (OUTPATIENT)
Dept: FAMILY MEDICINE | Facility: CLINIC | Age: 50
End: 2020-07-30
Payer: OTHER GOVERNMENT

## 2020-07-30 VITALS
DIASTOLIC BLOOD PRESSURE: 88 MMHG | SYSTOLIC BLOOD PRESSURE: 136 MMHG | BODY MASS INDEX: 38.22 KG/M2 | HEART RATE: 63 BPM | RESPIRATION RATE: 18 BRPM | WEIGHT: 297.81 LBS | HEIGHT: 74 IN | TEMPERATURE: 98 F | OXYGEN SATURATION: 97 %

## 2020-07-30 DIAGNOSIS — R73.03 PREDIABETES: ICD-10-CM

## 2020-07-30 DIAGNOSIS — I10 HYPERTENSION, BENIGN: Primary | ICD-10-CM

## 2020-07-30 DIAGNOSIS — Z11.4 ENCOUNTER FOR SCREENING FOR HIV: ICD-10-CM

## 2020-07-30 DIAGNOSIS — L30.9 DERMATITIS: ICD-10-CM

## 2020-07-30 DIAGNOSIS — Z11.59 NEED FOR HEPATITIS C SCREENING TEST: ICD-10-CM

## 2020-07-30 PROCEDURE — 99999 PR PBB SHADOW E&M-EST. PATIENT-LVL IV: ICD-10-PCS | Mod: PBBFAC,,, | Performed by: FAMILY MEDICINE

## 2020-07-30 PROCEDURE — 99214 PR OFFICE/OUTPT VISIT, EST, LEVL IV, 30-39 MIN: ICD-10-PCS | Mod: S$PBB,,, | Performed by: FAMILY MEDICINE

## 2020-07-30 PROCEDURE — 99999 PR PBB SHADOW E&M-EST. PATIENT-LVL IV: CPT | Mod: PBBFAC,,, | Performed by: FAMILY MEDICINE

## 2020-07-30 PROCEDURE — 99214 OFFICE O/P EST MOD 30 MIN: CPT | Mod: PBBFAC,PN | Performed by: FAMILY MEDICINE

## 2020-07-30 PROCEDURE — 99214 OFFICE O/P EST MOD 30 MIN: CPT | Mod: S$PBB,,, | Performed by: FAMILY MEDICINE

## 2020-07-30 RX ORDER — TRIAMCINOLONE ACETONIDE 1 MG/G
OINTMENT TOPICAL 2 TIMES DAILY
Qty: 30 G | Refills: 0 | Status: SHIPPED | OUTPATIENT
Start: 2020-07-30 | End: 2023-11-15

## 2020-08-03 NOTE — PROGRESS NOTES
"Subjective:       Patient ID: Jonah Gordon is a 50 y.o. male.    Chief Complaint: Establish Care and Follow-up (BP)    HPI   50 year old male comes in for follow up on hypertension. He reports taking his medications as prescribed. However he states that he has been eating poorly and not exercising as he should. He states that instead of adjusting medication he would like to work on these, and work on his weight. He would like 6 weeks to make these changes and then do a blood pressure check.    Review of Systems   Constitutional: Negative for chills, fever and unexpected weight change.   Eyes: Negative for visual disturbance.   Respiratory: Negative for cough, chest tightness, shortness of breath and wheezing.    Cardiovascular: Negative for chest pain, palpitations and leg swelling.   Gastrointestinal: Negative for abdominal pain, blood in stool, change in bowel habit, constipation, diarrhea and change in bowel habit.   Genitourinary: Negative for dysuria and hematuria.   Integumentary:         Thick, dark skin patch on back of right hand, for several months         Objective:     /88 (BP Location: Left arm, Patient Position: Sitting, BP Method: Large (Manual))   Pulse 63   Temp 98.1 °F (36.7 °C) (Oral)   Resp 18   Ht 6' 2" (1.88 m)   Wt 135.1 kg (297 lb 13.5 oz)   SpO2 97%   BMI 38.24 kg/m²     Physical Exam  Vitals signs reviewed.   Constitutional:       Appearance: He is well-developed. He is not diaphoretic.   HENT:      Head: Normocephalic.      Right Ear: External ear normal.      Left Ear: External ear normal.      Nose: Nose normal.      Mouth/Throat:      Pharynx: No oropharyngeal exudate.   Neck:      Musculoskeletal: Normal range of motion and neck supple.      Trachea: No tracheal deviation.   Cardiovascular:      Rate and Rhythm: Normal rate and regular rhythm.      Heart sounds: Normal heart sounds. No murmur.   Pulmonary:      Effort: Pulmonary effort is normal.      Breath sounds: " Normal breath sounds. No wheezing or rales.   Abdominal:      General: Bowel sounds are normal.      Palpations: Abdomen is soft. Abdomen is not rigid. There is no mass.      Tenderness: There is no abdominal tenderness. There is no guarding or rebound.   Lymphadenopathy:      Cervical: No cervical adenopathy.   Skin:         Neurological:      Mental Status: He is oriented to person, place, and time.      Sensory: No sensory deficit.      Motor: No atrophy.      Gait: Gait normal.      Deep Tendon Reflexes:      Reflex Scores:       Patellar reflexes are 2+ on the right side and 2+ on the left side.        Assessment:       1. Hypertension, benign    2. Prediabetes    3. Encounter for screening for HIV    4. Need for hepatitis C screening test    5. Dermatitis        Plan:       Jonah was seen today for establish care and follow-up.    Diagnoses and all orders for this visit:    Hypertension, benign  -     Comprehensive metabolic panel; Future  -     Lipid Panel; Future  -     Hemoglobin A1C; Future  Dietary and lifestyle changes discussed.  Recheck BP in 8 weeks.  Labs prior to visit.    Prediabetes  -     Hemoglobin A1C; Future  Check A1c    Encounter for screening for HIV  -     HIV 1/2 Ag/Ab (4th Gen); Future  Screen for HIV as per USPSTF guidelines    Need for hepatitis C screening test  -     Hepatitis C Antibody; Future  Screen for HCV as per USPSTF guidelines    Dermatitis  -     triamcinolone acetonide 0.1% (KENALOG) 0.1 % ointment; Apply topically 2 (two) times daily. Area on right hand for 7 days  Short course of topical steroid. Advised if not improved after 1-2 weeks, will need derm eval.   Discussed that this may be keloid, but would advise derm eval, to rule out more serious condition.

## 2020-09-11 ENCOUNTER — LAB VISIT (OUTPATIENT)
Dept: LAB | Facility: HOSPITAL | Age: 50
End: 2020-09-11
Attending: FAMILY MEDICINE
Payer: OTHER GOVERNMENT

## 2020-09-11 DIAGNOSIS — R73.03 PREDIABETES: ICD-10-CM

## 2020-09-11 DIAGNOSIS — Z11.59 NEED FOR HEPATITIS C SCREENING TEST: ICD-10-CM

## 2020-09-11 DIAGNOSIS — Z11.4 ENCOUNTER FOR SCREENING FOR HIV: ICD-10-CM

## 2020-09-11 DIAGNOSIS — I10 HYPERTENSION, BENIGN: ICD-10-CM

## 2020-09-11 LAB
ALBUMIN SERPL BCP-MCNC: 4.3 G/DL (ref 3.5–5.2)
ALP SERPL-CCNC: 44 U/L (ref 55–135)
ALT SERPL W/O P-5'-P-CCNC: 30 U/L (ref 10–44)
ANION GAP SERPL CALC-SCNC: 8 MMOL/L (ref 8–16)
AST SERPL-CCNC: 18 U/L (ref 10–40)
BILIRUB SERPL-MCNC: 0.4 MG/DL (ref 0.1–1)
BUN SERPL-MCNC: 18 MG/DL (ref 6–20)
CALCIUM SERPL-MCNC: 9.9 MG/DL (ref 8.7–10.5)
CHLORIDE SERPL-SCNC: 101 MMOL/L (ref 95–110)
CHOLEST SERPL-MCNC: 212 MG/DL (ref 120–199)
CHOLEST/HDLC SERPL: 5 {RATIO} (ref 2–5)
CO2 SERPL-SCNC: 31 MMOL/L (ref 23–29)
CREAT SERPL-MCNC: 1.4 MG/DL (ref 0.5–1.4)
EST. GFR  (AFRICAN AMERICAN): >60 ML/MIN/1.73 M^2
EST. GFR  (NON AFRICAN AMERICAN): 59 ML/MIN/1.73 M^2
GLUCOSE SERPL-MCNC: 132 MG/DL (ref 70–110)
HDLC SERPL-MCNC: 42 MG/DL (ref 40–75)
HDLC SERPL: 19.8 % (ref 20–50)
LDLC SERPL CALC-MCNC: 122.4 MG/DL (ref 63–159)
NONHDLC SERPL-MCNC: 170 MG/DL
POTASSIUM SERPL-SCNC: 3.2 MMOL/L (ref 3.5–5.1)
PROT SERPL-MCNC: 7.8 G/DL (ref 6–8.4)
SODIUM SERPL-SCNC: 140 MMOL/L (ref 136–145)
TRIGL SERPL-MCNC: 238 MG/DL (ref 30–150)

## 2020-09-11 PROCEDURE — 36415 COLL VENOUS BLD VENIPUNCTURE: CPT | Mod: PN

## 2020-09-11 PROCEDURE — 83036 HEMOGLOBIN GLYCOSYLATED A1C: CPT

## 2020-09-11 PROCEDURE — 86703 HIV-1/HIV-2 1 RESULT ANTBDY: CPT

## 2020-09-11 PROCEDURE — 86803 HEPATITIS C AB TEST: CPT

## 2020-09-11 PROCEDURE — 80053 COMPREHEN METABOLIC PANEL: CPT

## 2020-09-11 PROCEDURE — 80061 LIPID PANEL: CPT

## 2020-09-12 LAB
ESTIMATED AVG GLUCOSE: 146 MG/DL (ref 68–131)
HBA1C MFR BLD HPLC: 6.7 % (ref 4–5.6)

## 2020-09-14 LAB
HCV AB SERPL QL IA: NEGATIVE
HIV 1+2 AB+HIV1 P24 AG SERPL QL IA: NEGATIVE

## 2020-09-21 ENCOUNTER — OFFICE VISIT (OUTPATIENT)
Dept: FAMILY MEDICINE | Facility: CLINIC | Age: 50
End: 2020-09-21
Payer: OTHER GOVERNMENT

## 2020-09-21 VITALS
TEMPERATURE: 98 F | OXYGEN SATURATION: 97 % | BODY MASS INDEX: 38.42 KG/M2 | SYSTOLIC BLOOD PRESSURE: 132 MMHG | HEIGHT: 74 IN | HEART RATE: 70 BPM | DIASTOLIC BLOOD PRESSURE: 81 MMHG | WEIGHT: 299.38 LBS | RESPIRATION RATE: 18 BRPM

## 2020-09-21 DIAGNOSIS — E87.6 HYPOKALEMIA: ICD-10-CM

## 2020-09-21 DIAGNOSIS — I10 HYPERTENSION, BENIGN: ICD-10-CM

## 2020-09-21 DIAGNOSIS — E11.9 NEWLY DIAGNOSED DIABETES: Primary | ICD-10-CM

## 2020-09-21 DIAGNOSIS — D72.819 LEUKOPENIA, UNSPECIFIED TYPE: ICD-10-CM

## 2020-09-21 DIAGNOSIS — E78.5 DYSLIPIDEMIA: ICD-10-CM

## 2020-09-21 DIAGNOSIS — E66.01 CLASS 2 SEVERE OBESITY DUE TO EXCESS CALORIES WITH SERIOUS COMORBIDITY AND BODY MASS INDEX (BMI) OF 38.0 TO 38.9 IN ADULT: ICD-10-CM

## 2020-09-21 PROBLEM — E66.812 CLASS 2 SEVERE OBESITY DUE TO EXCESS CALORIES WITH SERIOUS COMORBIDITY AND BODY MASS INDEX (BMI) OF 38.0 TO 38.9 IN ADULT: Status: ACTIVE | Noted: 2020-09-21

## 2020-09-21 PROCEDURE — 99999 PR PBB SHADOW E&M-EST. PATIENT-LVL IV: CPT | Mod: PBBFAC,,, | Performed by: FAMILY MEDICINE

## 2020-09-21 PROCEDURE — 99214 OFFICE O/P EST MOD 30 MIN: CPT | Mod: S$PBB,,, | Performed by: FAMILY MEDICINE

## 2020-09-21 PROCEDURE — 99999 PR PBB SHADOW E&M-EST. PATIENT-LVL IV: ICD-10-PCS | Mod: PBBFAC,,, | Performed by: FAMILY MEDICINE

## 2020-09-21 PROCEDURE — 99214 OFFICE O/P EST MOD 30 MIN: CPT | Mod: PBBFAC,PN | Performed by: FAMILY MEDICINE

## 2020-09-21 PROCEDURE — 99214 PR OFFICE/OUTPT VISIT, EST, LEVL IV, 30-39 MIN: ICD-10-PCS | Mod: S$PBB,,, | Performed by: FAMILY MEDICINE

## 2020-09-22 ENCOUNTER — LAB VISIT (OUTPATIENT)
Dept: LAB | Facility: HOSPITAL | Age: 50
End: 2020-09-22
Attending: FAMILY MEDICINE
Payer: OTHER GOVERNMENT

## 2020-09-22 DIAGNOSIS — E87.6 HYPOKALEMIA: ICD-10-CM

## 2020-09-22 LAB — POTASSIUM SERPL-SCNC: 3.4 MMOL/L (ref 3.5–5.1)

## 2020-09-22 PROCEDURE — 84132 ASSAY OF SERUM POTASSIUM: CPT

## 2020-09-22 NOTE — PROGRESS NOTES
Subjective:       Patient ID: Jonah Gordon is a 50 y.o. male.    Chief Complaint: Follow-up (Pt has no complaints on today )    HPI   50 year old male comes in for follow up on hypertension. He reports that his blood pressure is doing better. States taht at home they have been 120s/70s-80s. He recently stubbed his left toe and has been taking an NSAID and sine then his blood pressure is slightly higher. The patient does report that his weight has been higher recently and his clothes has been fitting tighter. He is not happy with this.     Review of Systems   Constitutional: Negative for unexpected weight change.   Eyes: Negative for visual disturbance.   Respiratory: Negative for shortness of breath and wheezing.    Cardiovascular: Negative for chest pain and palpitations.   Gastrointestinal: Negative for abdominal pain, blood in stool, change in bowel habit and change in bowel habit.   Endocrine: Negative for polydipsia, polyphagia and polyuria.         Objective:      Physical Exam  Vitals signs reviewed.   Constitutional:       Appearance: He is well-developed. He is not diaphoretic.   HENT:      Head: Normocephalic.      Right Ear: External ear normal.      Left Ear: External ear normal.      Nose: Nose normal.      Mouth/Throat:      Pharynx: No oropharyngeal exudate.   Neck:      Musculoskeletal: Normal range of motion and neck supple.      Trachea: No tracheal deviation.   Cardiovascular:      Rate and Rhythm: Normal rate and regular rhythm.      Heart sounds: Normal heart sounds. No murmur.   Pulmonary:      Effort: Pulmonary effort is normal.      Breath sounds: Normal breath sounds. No wheezing or rales.   Abdominal:      General: Bowel sounds are normal.      Palpations: Abdomen is soft. Abdomen is not rigid. There is no mass.      Tenderness: There is no abdominal tenderness. There is no guarding or rebound.   Lymphadenopathy:      Cervical: No cervical adenopathy.   Neurological:      Mental Status:  He is oriented to person, place, and time.           Protective Sensation (w/ 10 gram monofilament):  Right: Intact  Left: Intact    Visual Inspection:  Normal -  Bilateral    Pedal Pulses:   Right: Present  Left: Present    Posterior tibialis:   Right:Present  Left: Present    Lab Visit on 09/11/2020   Component Date Value Ref Range Status    Sodium 09/11/2020 140  136 - 145 mmol/L Final    Potassium 09/11/2020 3.2* 3.5 - 5.1 mmol/L Final    Chloride 09/11/2020 101  95 - 110 mmol/L Final    CO2 09/11/2020 31* 23 - 29 mmol/L Final    Glucose 09/11/2020 132* 70 - 110 mg/dL Final    BUN, Bld 09/11/2020 18  6 - 20 mg/dL Final    Creatinine 09/11/2020 1.4  0.5 - 1.4 mg/dL Final    Calcium 09/11/2020 9.9  8.7 - 10.5 mg/dL Final    Total Protein 09/11/2020 7.8  6.0 - 8.4 g/dL Final    Albumin 09/11/2020 4.3  3.5 - 5.2 g/dL Final    Total Bilirubin 09/11/2020 0.4  0.1 - 1.0 mg/dL Final    Comment: For infants and newborns, interpretation of results should be based  on gestational age, weight and in agreement with clinical  observations.  Premature Infant recommended reference ranges:  Up to 24 hours.............<8.0 mg/dL  Up to 48 hours............<12.0 mg/dL  3-5 days..................<15.0 mg/dL  6-29 days.................<15.0 mg/dL      Alkaline Phosphatase 09/11/2020 44* 55 - 135 U/L Final    AST 09/11/2020 18  10 - 40 U/L Final    ALT 09/11/2020 30  10 - 44 U/L Final    Anion Gap 09/11/2020 8  8 - 16 mmol/L Final    eGFR if African American 09/11/2020 >60  >60 mL/min/1.73 m^2 Final    eGFR if non African American 09/11/2020 59* >60 mL/min/1.73 m^2 Final    Comment: Calculation used to obtain the estimated glomerular filtration  rate (eGFR) is the CKD-EPI equation.       Cholesterol 09/11/2020 212* 120 - 199 mg/dL Final    Comment: The National Cholesterol Education Program (NCEP) has set the  following guidelines (reference ranges) for Cholesterol:  Optimal.....................<200  mg/dL  Borderline High.............200-239 mg/dL  High........................> or = 240 mg/dL      Triglycerides 09/11/2020 238* 30 - 150 mg/dL Final    Comment: The National Cholesterol Education Program (NCEP) has set the  following guidelines (reference values) for triglycerides:  Normal......................<150 mg/dL  Borderline High.............150-199 mg/dL  High........................200-499 mg/dL      HDL 09/11/2020 42  40 - 75 mg/dL Final    Comment: The National Cholesterol Education Program (NCEP) has set the  following guidelines (reference values) for HDL Cholesterol:  Low...............<40 mg/dL  Optimal...........>60 mg/dL      LDL Cholesterol 09/11/2020 122.4  63.0 - 159.0 mg/dL Final    Comment: The National Cholesterol Education Program (NCEP) has set the  following guidelines (reference values) for LDL Cholesterol:  Optimal.......................<130 mg/dL  Borderline High...............130-159 mg/dL  High..........................160-189 mg/dL  Very High.....................>190 mg/dL      Hdl/Cholesterol Ratio 09/11/2020 19.8* 20.0 - 50.0 % Final    Total Cholesterol/HDL Ratio 09/11/2020 5.0  2.0 - 5.0 Final    Non-HDL Cholesterol 09/11/2020 170  mg/dL Final    Comment: Risk category and Non-HDL cholesterol goals:  Coronary heart disease (CHD)or equivalent (10-year risk of CHD >20%):  Non-HDL cholesterol goal     <130 mg/dL  Two or more CHD risk factors and 10-year risk of CHD <= 20%:  Non-HDL cholesterol goal     <160 mg/dL  0 to 1 CHD risk factor:  Non-HDL cholesterol goal     <190 mg/dL      Hemoglobin A1C 09/11/2020 6.7* 4.0 - 5.6 % Final    Comment: ADA Screening Guidelines:  5.7-6.4%  Consistent with prediabetes  >or=6.5%  Consistent with diabetes  High levels of fetal hemoglobin interfere with the HbA1C  assay. Heterozygous hemoglobin variants (HbS, HgC, etc)do  not significantly interfere with this assay.   However, presence of multiple variants may affect accuracy.       Estimated Avg Glucose 09/11/2020 146* 68 - 131 mg/dL Final    Hepatitis C Ab 09/11/2020 Negative  Negative Final    HIV 1/2 Ag/Ab 09/11/2020 Negative  Negative Final       Assessment:       1. Newly diagnosed diabetes    2. Hypokalemia    3. Hypertension, benign    4. Dyslipidemia    5. Leukopenia, unspecified type    6. Class 2 severe obesity due to excess calories with serious comorbidity and body mass index (BMI) of 38.0 to 38.9 in adult        Plan:       Jonah was seen today for follow-up.    Diagnoses and all orders for this visit:    Newly diagnosed diabetes  -     Ambulatory referral/consult to Optometry; Future  -     Comprehensive metabolic panel; Future  -     Lipid Panel; Future  -     Hemoglobin A1C; Future  Fasting glucose was 132 and A1c was 6.7, consistent with new diagnosis of diabetes. Diagnosis explained to patient.  Consequences and risks associated with this explained to patient.  Patient would prefer not to be on medication.   Dietary and lifestyle changes reviewed.  Weightloss encouraged.     Hypokalemia  -     Potassium; Future  Recheck potassium    Hypertension, benign  -     Comprehensive metabolic panel; Future  -     Lipid Panel; Future  BP improved  Continue current regimen  If hypokalemia continues, consider adjusting chlorthalidone    Dyslipidemia  -     Comprehensive metabolic panel; Future  -     Lipid Panel; Future  Discussed he will need a statin given diabetes.  Prefers to make dietary changes first.    Leukopenia, unspecified type  -     CBC auto differential; Future  Repeat CBC with next labs    Class 2 severe obesity due to excess calories with serious comorbidity and body mass index (BMI) of 38.0 to 38.9 in adult  Offered patient Rx for Adipex to assist with weight loss but he declined.

## 2021-06-30 ENCOUNTER — OFFICE VISIT (OUTPATIENT)
Dept: FAMILY MEDICINE | Facility: CLINIC | Age: 51
End: 2021-06-30
Payer: OTHER GOVERNMENT

## 2021-06-30 VITALS
BODY MASS INDEX: 39.55 KG/M2 | OXYGEN SATURATION: 98 % | HEART RATE: 80 BPM | SYSTOLIC BLOOD PRESSURE: 194 MMHG | HEIGHT: 74 IN | TEMPERATURE: 99 F | DIASTOLIC BLOOD PRESSURE: 102 MMHG | RESPIRATION RATE: 18 BRPM | WEIGHT: 308.19 LBS

## 2021-06-30 DIAGNOSIS — E11.9 DIABETES MELLITUS WITHOUT COMPLICATION: Primary | ICD-10-CM

## 2021-06-30 DIAGNOSIS — E78.5 DYSLIPIDEMIA: ICD-10-CM

## 2021-06-30 DIAGNOSIS — Z12.12 SCREENING FOR COLORECTAL CANCER: ICD-10-CM

## 2021-06-30 DIAGNOSIS — Z12.11 SCREENING FOR COLORECTAL CANCER: ICD-10-CM

## 2021-06-30 DIAGNOSIS — I10 HYPERTENSION, BENIGN: ICD-10-CM

## 2021-06-30 PROCEDURE — 99999 PR PBB SHADOW E&M-EST. PATIENT-LVL IV: CPT | Mod: PBBFAC,,, | Performed by: FAMILY MEDICINE

## 2021-06-30 PROCEDURE — 99214 OFFICE O/P EST MOD 30 MIN: CPT | Mod: S$PBB,,, | Performed by: FAMILY MEDICINE

## 2021-06-30 PROCEDURE — 99214 OFFICE O/P EST MOD 30 MIN: CPT | Mod: PBBFAC,PN | Performed by: FAMILY MEDICINE

## 2021-06-30 PROCEDURE — 99214 PR OFFICE/OUTPT VISIT, EST, LEVL IV, 30-39 MIN: ICD-10-PCS | Mod: S$PBB,,, | Performed by: FAMILY MEDICINE

## 2021-06-30 PROCEDURE — 99999 PR PBB SHADOW E&M-EST. PATIENT-LVL IV: ICD-10-PCS | Mod: PBBFAC,,, | Performed by: FAMILY MEDICINE

## 2021-06-30 RX ORDER — DEXAMETHASONE 2 MG/1
TABLET ORAL
COMMUNITY
Start: 2021-03-11 | End: 2021-06-30

## 2021-06-30 RX ORDER — METOPROLOL SUCCINATE 200 MG/1
200 TABLET, EXTENDED RELEASE ORAL DAILY
Qty: 90 TABLET | Refills: 0 | Status: SHIPPED | OUTPATIENT
Start: 2021-06-30 | End: 2021-09-22

## 2021-06-30 RX ORDER — IBUPROFEN 800 MG/1
TABLET ORAL
COMMUNITY
Start: 2020-09-07 | End: 2021-08-11

## 2021-06-30 RX ORDER — AMLODIPINE BESYLATE 10 MG/1
10 TABLET ORAL DAILY
Qty: 90 TABLET | Refills: 0 | Status: SHIPPED | OUTPATIENT
Start: 2021-06-30 | End: 2021-09-28

## 2021-06-30 RX ORDER — CHLORTHALIDONE 50 MG/1
50 TABLET ORAL DAILY
Qty: 90 TABLET | Refills: 0 | Status: SHIPPED | OUTPATIENT
Start: 2021-06-30 | End: 2021-09-28

## 2021-07-06 ENCOUNTER — LAB VISIT (OUTPATIENT)
Dept: LAB | Facility: HOSPITAL | Age: 51
End: 2021-07-06
Attending: FAMILY MEDICINE
Payer: OTHER GOVERNMENT

## 2021-07-06 DIAGNOSIS — I10 HYPERTENSION, BENIGN: ICD-10-CM

## 2021-07-06 DIAGNOSIS — E11.9 DIABETES MELLITUS WITHOUT COMPLICATION: ICD-10-CM

## 2021-07-06 DIAGNOSIS — E78.5 DYSLIPIDEMIA: ICD-10-CM

## 2021-07-06 LAB
ALBUMIN SERPL BCP-MCNC: 4.3 G/DL (ref 3.5–5.2)
ALP SERPL-CCNC: 62 U/L (ref 55–135)
ALT SERPL W/O P-5'-P-CCNC: 35 U/L (ref 10–44)
ANION GAP SERPL CALC-SCNC: 7 MMOL/L (ref 8–16)
AST SERPL-CCNC: 21 U/L (ref 10–40)
BASOPHILS # BLD AUTO: 0.05 K/UL (ref 0–0.2)
BASOPHILS NFR BLD: 1.4 % (ref 0–1.9)
BILIRUB SERPL-MCNC: 0.5 MG/DL (ref 0.1–1)
BUN SERPL-MCNC: 19 MG/DL (ref 6–20)
CALCIUM SERPL-MCNC: 9.7 MG/DL (ref 8.7–10.5)
CHLORIDE SERPL-SCNC: 105 MMOL/L (ref 95–110)
CHOLEST SERPL-MCNC: 206 MG/DL (ref 120–199)
CHOLEST/HDLC SERPL: 4.7 {RATIO} (ref 2–5)
CO2 SERPL-SCNC: 27 MMOL/L (ref 23–29)
CREAT SERPL-MCNC: 1.6 MG/DL (ref 0.5–1.4)
DIFFERENTIAL METHOD: ABNORMAL
EOSINOPHIL # BLD AUTO: 0.2 K/UL (ref 0–0.5)
EOSINOPHIL NFR BLD: 4.5 % (ref 0–8)
ERYTHROCYTE [DISTWIDTH] IN BLOOD BY AUTOMATED COUNT: 14.2 % (ref 11.5–14.5)
EST. GFR  (AFRICAN AMERICAN): 57 ML/MIN/1.73 M^2
EST. GFR  (NON AFRICAN AMERICAN): 49 ML/MIN/1.73 M^2
ESTIMATED AVG GLUCOSE: 143 MG/DL (ref 68–131)
GLUCOSE SERPL-MCNC: 148 MG/DL (ref 70–110)
HBA1C MFR BLD: 6.6 % (ref 4–5.6)
HCT VFR BLD AUTO: 48.9 % (ref 40–54)
HDLC SERPL-MCNC: 44 MG/DL (ref 40–75)
HDLC SERPL: 21.4 % (ref 20–50)
HGB BLD-MCNC: 15.3 G/DL (ref 14–18)
IMM GRANULOCYTES # BLD AUTO: 0 K/UL (ref 0–0.04)
IMM GRANULOCYTES NFR BLD AUTO: 0 % (ref 0–0.5)
LDLC SERPL CALC-MCNC: 131 MG/DL (ref 63–159)
LYMPHOCYTES # BLD AUTO: 1.6 K/UL (ref 1–4.8)
LYMPHOCYTES NFR BLD: 44.8 % (ref 18–48)
MCH RBC QN AUTO: 28.7 PG (ref 27–31)
MCHC RBC AUTO-ENTMCNC: 31.3 G/DL (ref 32–36)
MCV RBC AUTO: 92 FL (ref 82–98)
MONOCYTES # BLD AUTO: 0.5 K/UL (ref 0.3–1)
MONOCYTES NFR BLD: 12.7 % (ref 4–15)
NEUTROPHILS # BLD AUTO: 1.3 K/UL (ref 1.8–7.7)
NEUTROPHILS NFR BLD: 36.6 % (ref 38–73)
NONHDLC SERPL-MCNC: 162 MG/DL
NRBC BLD-RTO: 0 /100 WBC
PLATELET # BLD AUTO: 307 K/UL (ref 150–450)
PMV BLD AUTO: 10.5 FL (ref 9.2–12.9)
POTASSIUM SERPL-SCNC: 3.6 MMOL/L (ref 3.5–5.1)
PROT SERPL-MCNC: 7.9 G/DL (ref 6–8.4)
RBC # BLD AUTO: 5.33 M/UL (ref 4.6–6.2)
SODIUM SERPL-SCNC: 139 MMOL/L (ref 136–145)
TRIGL SERPL-MCNC: 155 MG/DL (ref 30–150)
WBC # BLD AUTO: 3.53 K/UL (ref 3.9–12.7)

## 2021-07-06 PROCEDURE — 80053 COMPREHEN METABOLIC PANEL: CPT | Performed by: FAMILY MEDICINE

## 2021-07-06 PROCEDURE — 36415 COLL VENOUS BLD VENIPUNCTURE: CPT | Mod: PN | Performed by: FAMILY MEDICINE

## 2021-07-06 PROCEDURE — 85025 COMPLETE CBC W/AUTO DIFF WBC: CPT | Performed by: FAMILY MEDICINE

## 2021-07-06 PROCEDURE — 83036 HEMOGLOBIN GLYCOSYLATED A1C: CPT | Performed by: FAMILY MEDICINE

## 2021-07-06 PROCEDURE — 80061 LIPID PANEL: CPT | Performed by: FAMILY MEDICINE

## 2021-07-08 DIAGNOSIS — E11.9 TYPE 2 DIABETES MELLITUS WITHOUT COMPLICATION, UNSPECIFIED WHETHER LONG TERM INSULIN USE: ICD-10-CM

## 2021-07-15 ENCOUNTER — PATIENT OUTREACH (OUTPATIENT)
Dept: ADMINISTRATIVE | Facility: OTHER | Age: 51
End: 2021-07-15

## 2021-07-15 ENCOUNTER — PATIENT MESSAGE (OUTPATIENT)
Dept: ADMINISTRATIVE | Facility: OTHER | Age: 51
End: 2021-07-15

## 2021-08-04 ENCOUNTER — OFFICE VISIT (OUTPATIENT)
Dept: URGENT CARE | Facility: CLINIC | Age: 51
End: 2021-08-04
Payer: OTHER GOVERNMENT

## 2021-08-04 VITALS
DIASTOLIC BLOOD PRESSURE: 70 MMHG | SYSTOLIC BLOOD PRESSURE: 123 MMHG | WEIGHT: 300 LBS | HEIGHT: 74 IN | HEART RATE: 77 BPM | OXYGEN SATURATION: 100 % | BODY MASS INDEX: 38.5 KG/M2 | TEMPERATURE: 100 F

## 2021-08-04 DIAGNOSIS — R05.9 COUGH: ICD-10-CM

## 2021-08-04 DIAGNOSIS — Z11.52 ENCOUNTER FOR SCREENING FOR COVID-19: ICD-10-CM

## 2021-08-04 DIAGNOSIS — U07.1 COVID-19 VIRUS DETECTED: ICD-10-CM

## 2021-08-04 DIAGNOSIS — U07.1 COVID-19 VIRUS DETECTED: Primary | ICD-10-CM

## 2021-08-04 LAB
CTP QC/QA: YES
SARS-COV-2 RDRP RESP QL NAA+PROBE: POSITIVE

## 2021-08-04 PROCEDURE — 99214 PR OFFICE/OUTPT VISIT, EST, LEVL IV, 30-39 MIN: ICD-10-PCS | Mod: S$GLB,CS,, | Performed by: NURSE PRACTITIONER

## 2021-08-04 PROCEDURE — U0002: ICD-10-PCS | Mod: QW,S$GLB,, | Performed by: NURSE PRACTITIONER

## 2021-08-04 PROCEDURE — U0002 COVID-19 LAB TEST NON-CDC: HCPCS | Mod: QW,S$GLB,, | Performed by: NURSE PRACTITIONER

## 2021-08-04 PROCEDURE — 99214 OFFICE O/P EST MOD 30 MIN: CPT | Mod: S$GLB,CS,, | Performed by: NURSE PRACTITIONER

## 2021-08-04 RX ORDER — BACLOFEN 10 MG/1
TABLET ORAL
Status: ON HOLD | COMMUNITY
Start: 2021-03-02 | End: 2021-08-16 | Stop reason: HOSPADM

## 2021-08-04 RX ORDER — BENZONATATE 100 MG/1
200 CAPSULE ORAL 3 TIMES DAILY PRN
Qty: 30 CAPSULE | Refills: 0 | Status: ON HOLD | OUTPATIENT
Start: 2021-08-04 | End: 2021-08-16 | Stop reason: HOSPADM

## 2021-08-09 ENCOUNTER — NURSE TRIAGE (OUTPATIENT)
Dept: ADMINISTRATIVE | Facility: CLINIC | Age: 51
End: 2021-08-09

## 2021-08-10 ENCOUNTER — NURSE TRIAGE (OUTPATIENT)
Dept: ADMINISTRATIVE | Facility: CLINIC | Age: 51
End: 2021-08-10

## 2021-08-11 ENCOUNTER — INFUSION (OUTPATIENT)
Dept: INFECTIOUS DISEASES | Facility: HOSPITAL | Age: 51
DRG: 177 | End: 2021-08-11
Attending: INTERNAL MEDICINE
Payer: OTHER GOVERNMENT

## 2021-08-11 VITALS
HEIGHT: 74 IN | RESPIRATION RATE: 22 BRPM | SYSTOLIC BLOOD PRESSURE: 153 MMHG | HEART RATE: 91 BPM | WEIGHT: 300 LBS | OXYGEN SATURATION: 100 % | BODY MASS INDEX: 38.5 KG/M2 | TEMPERATURE: 100 F | DIASTOLIC BLOOD PRESSURE: 81 MMHG

## 2021-08-11 DIAGNOSIS — U07.1 COVID-19: Primary | ICD-10-CM

## 2021-08-11 PROCEDURE — 63600175 PHARM REV CODE 636 W HCPCS: Performed by: INTERNAL MEDICINE

## 2021-08-11 PROCEDURE — M0243 CASIRIVI AND IMDEVI INFUSION: HCPCS | Performed by: INTERNAL MEDICINE

## 2021-08-11 PROCEDURE — 25000003 PHARM REV CODE 250: Performed by: INTERNAL MEDICINE

## 2021-08-11 RX ORDER — ALBUTEROL SULFATE 90 UG/1
2 AEROSOL, METERED RESPIRATORY (INHALATION)
Status: DISCONTINUED | OUTPATIENT
Start: 2021-08-11 | End: 2022-02-02

## 2021-08-11 RX ORDER — DIPHENHYDRAMINE HYDROCHLORIDE 50 MG/ML
25 INJECTION INTRAMUSCULAR; INTRAVENOUS ONCE AS NEEDED
Status: DISCONTINUED | OUTPATIENT
Start: 2021-08-11 | End: 2022-02-02

## 2021-08-11 RX ORDER — EPINEPHRINE 0.3 MG/.3ML
0.3 INJECTION SUBCUTANEOUS
Status: DISCONTINUED | OUTPATIENT
Start: 2021-08-11 | End: 2022-02-02

## 2021-08-11 RX ORDER — IBUPROFEN 600 MG/1
600 TABLET ORAL ONCE
Status: COMPLETED | OUTPATIENT
Start: 2021-08-11 | End: 2021-08-11

## 2021-08-11 RX ORDER — ONDANSETRON 4 MG/1
4 TABLET, ORALLY DISINTEGRATING ORAL ONCE AS NEEDED
Status: DISCONTINUED | OUTPATIENT
Start: 2021-08-11 | End: 2021-08-16 | Stop reason: HOSPADM

## 2021-08-11 RX ORDER — ACETAMINOPHEN 325 MG/1
650 TABLET ORAL ONCE AS NEEDED
Status: DISCONTINUED | OUTPATIENT
Start: 2021-08-11 | End: 2021-08-16 | Stop reason: HOSPADM

## 2021-08-11 RX ORDER — SODIUM CHLORIDE 0.9 % (FLUSH) 0.9 %
10 SYRINGE (ML) INJECTION
Status: DISCONTINUED | OUTPATIENT
Start: 2021-08-11 | End: 2021-08-16 | Stop reason: HOSPADM

## 2021-08-11 RX ADMIN — IBUPROFEN 600 MG: 600 TABLET, FILM COATED ORAL at 12:08

## 2021-08-11 RX ADMIN — CASIRIVIMAB AND IMDEVIMAB 600 MG: 600; 600 INJECTION, SOLUTION, CONCENTRATE INTRAVENOUS at 11:08

## 2021-08-13 ENCOUNTER — HOSPITAL ENCOUNTER (INPATIENT)
Facility: HOSPITAL | Age: 51
LOS: 3 days | Discharge: HOME | DRG: 177 | End: 2021-08-16
Attending: EMERGENCY MEDICINE | Admitting: OTOLARYNGOLOGY
Payer: OTHER GOVERNMENT

## 2021-08-13 ENCOUNTER — NURSE TRIAGE (OUTPATIENT)
Dept: ADMINISTRATIVE | Facility: CLINIC | Age: 51
End: 2021-08-13

## 2021-08-13 DIAGNOSIS — U07.1 PNEUMONIA DUE TO COVID-19 VIRUS: Primary | ICD-10-CM

## 2021-08-13 DIAGNOSIS — J12.82 PNEUMONIA DUE TO COVID-19 VIRUS: Primary | ICD-10-CM

## 2021-08-13 LAB
25(OH)D3+25(OH)D2 SERPL-MCNC: 27 NG/ML (ref 30–96)
ALBUMIN SERPL BCP-MCNC: 3.5 G/DL (ref 3.5–5.2)
ALP SERPL-CCNC: 50 U/L (ref 55–135)
ALT SERPL W/O P-5'-P-CCNC: 70 U/L (ref 10–44)
ANION GAP SERPL CALC-SCNC: 9 MMOL/L (ref 8–16)
APTT BLDCRRT: 27.3 SEC (ref 21–32)
AST SERPL-CCNC: 72 U/L (ref 10–40)
BASOPHILS # BLD AUTO: 0.02 K/UL (ref 0–0.2)
BASOPHILS NFR BLD: 0.5 % (ref 0–1.9)
BILIRUB SERPL-MCNC: 0.5 MG/DL (ref 0.1–1)
BNP SERPL-MCNC: 22 PG/ML (ref 0–99)
BUN SERPL-MCNC: 13 MG/DL (ref 6–20)
CALCIUM SERPL-MCNC: 9.3 MG/DL (ref 8.7–10.5)
CHLORIDE SERPL-SCNC: 103 MMOL/L (ref 95–110)
CK SERPL-CCNC: 1038 U/L (ref 20–200)
CO2 SERPL-SCNC: 25 MMOL/L (ref 23–29)
CREAT SERPL-MCNC: 1 MG/DL (ref 0.5–1.4)
CRP SERPL-MCNC: 82.7 MG/L (ref 0–8.2)
D DIMER PPP IA.FEU-MCNC: 0.53 MG/L FEU
DIFFERENTIAL METHOD: ABNORMAL
EOSINOPHIL # BLD AUTO: 0 K/UL (ref 0–0.5)
EOSINOPHIL NFR BLD: 0.5 % (ref 0–8)
ERYTHROCYTE [DISTWIDTH] IN BLOOD BY AUTOMATED COUNT: 15 % (ref 11.5–14.5)
ERYTHROCYTE [SEDIMENTATION RATE] IN BLOOD BY WESTERGREN METHOD: 82 MM/HR (ref 0–23)
EST. GFR  (AFRICAN AMERICAN): >60 ML/MIN/1.73 M^2
EST. GFR  (NON AFRICAN AMERICAN): >60 ML/MIN/1.73 M^2
FERRITIN SERPL-MCNC: 3199 NG/ML (ref 20–300)
GLUCOSE SERPL-MCNC: 148 MG/DL (ref 70–110)
HCT VFR BLD AUTO: 41.1 % (ref 40–54)
HCV AB SERPL QL IA: NEGATIVE
HGB BLD-MCNC: 12.9 G/DL (ref 14–18)
HIV 1+2 AB+HIV1 P24 AG SERPL QL IA: NEGATIVE
IMM GRANULOCYTES # BLD AUTO: 0.07 K/UL (ref 0–0.04)
IMM GRANULOCYTES NFR BLD AUTO: 1.8 % (ref 0–0.5)
INR PPP: 1 (ref 0.8–1.2)
LACTATE SERPL-SCNC: 0.8 MMOL/L (ref 0.5–2.2)
LDH SERPL L TO P-CCNC: 546 U/L (ref 110–260)
LYMPHOCYTES # BLD AUTO: 0.9 K/UL (ref 1–4.8)
LYMPHOCYTES NFR BLD: 21.6 % (ref 18–48)
MCH RBC QN AUTO: 27.7 PG (ref 27–31)
MCHC RBC AUTO-ENTMCNC: 31.4 G/DL (ref 32–36)
MCV RBC AUTO: 88 FL (ref 82–98)
MONOCYTES # BLD AUTO: 0.3 K/UL (ref 0.3–1)
MONOCYTES NFR BLD: 8 % (ref 4–15)
NEUTROPHILS # BLD AUTO: 2.7 K/UL (ref 1.8–7.7)
NEUTROPHILS NFR BLD: 67.6 % (ref 38–73)
NRBC BLD-RTO: 0 /100 WBC
PLATELET # BLD AUTO: 292 K/UL (ref 150–450)
PMV BLD AUTO: 9.8 FL (ref 9.2–12.9)
POTASSIUM SERPL-SCNC: 3.7 MMOL/L (ref 3.5–5.1)
PROCALCITONIN SERPL IA-MCNC: 0.07 NG/ML
PROT SERPL-MCNC: 7.6 G/DL (ref 6–8.4)
PROTHROMBIN TIME: 10.6 SEC (ref 9–12.5)
RBC # BLD AUTO: 4.66 M/UL (ref 4.6–6.2)
SODIUM SERPL-SCNC: 137 MMOL/L (ref 136–145)
TROPONIN I SERPL DL<=0.01 NG/ML-MCNC: 0.01 NG/ML (ref 0–0.03)
TROPONIN I SERPL DL<=0.01 NG/ML-MCNC: <0.006 NG/ML (ref 0–0.03)
WBC # BLD AUTO: 3.98 K/UL (ref 3.9–12.7)

## 2021-08-13 PROCEDURE — 99285 EMERGENCY DEPT VISIT HI MDM: CPT | Mod: 25

## 2021-08-13 PROCEDURE — 85652 RBC SED RATE AUTOMATED: CPT | Performed by: STUDENT IN AN ORGANIZED HEALTH CARE EDUCATION/TRAINING PROGRAM

## 2021-08-13 PROCEDURE — 85610 PROTHROMBIN TIME: CPT | Performed by: STUDENT IN AN ORGANIZED HEALTH CARE EDUCATION/TRAINING PROGRAM

## 2021-08-13 PROCEDURE — 93005 ELECTROCARDIOGRAM TRACING: CPT

## 2021-08-13 PROCEDURE — 83615 LACTATE (LD) (LDH) ENZYME: CPT | Performed by: EMERGENCY MEDICINE

## 2021-08-13 PROCEDURE — 99223 PR INITIAL HOSPITAL CARE,LEVL III: ICD-10-PCS | Mod: ,,, | Performed by: OTOLARYNGOLOGY

## 2021-08-13 PROCEDURE — 86803 HEPATITIS C AB TEST: CPT | Performed by: EMERGENCY MEDICINE

## 2021-08-13 PROCEDURE — 25000003 PHARM REV CODE 250: Performed by: PHYSICIAN ASSISTANT

## 2021-08-13 PROCEDURE — 83880 ASSAY OF NATRIURETIC PEPTIDE: CPT | Performed by: STUDENT IN AN ORGANIZED HEALTH CARE EDUCATION/TRAINING PROGRAM

## 2021-08-13 PROCEDURE — 36415 COLL VENOUS BLD VENIPUNCTURE: CPT | Performed by: STUDENT IN AN ORGANIZED HEALTH CARE EDUCATION/TRAINING PROGRAM

## 2021-08-13 PROCEDURE — 80053 COMPREHEN METABOLIC PANEL: CPT | Performed by: EMERGENCY MEDICINE

## 2021-08-13 PROCEDURE — 87040 BLOOD CULTURE FOR BACTERIA: CPT | Mod: 59 | Performed by: STUDENT IN AN ORGANIZED HEALTH CARE EDUCATION/TRAINING PROGRAM

## 2021-08-13 PROCEDURE — 99285 PR EMERGENCY DEPT VISIT,LEVEL V: ICD-10-PCS | Mod: CR,,, | Performed by: EMERGENCY MEDICINE

## 2021-08-13 PROCEDURE — 87389 HIV-1 AG W/HIV-1&-2 AB AG IA: CPT | Performed by: EMERGENCY MEDICINE

## 2021-08-13 PROCEDURE — 85379 FIBRIN DEGRADATION QUANT: CPT | Performed by: STUDENT IN AN ORGANIZED HEALTH CARE EDUCATION/TRAINING PROGRAM

## 2021-08-13 PROCEDURE — 85730 THROMBOPLASTIN TIME PARTIAL: CPT | Performed by: STUDENT IN AN ORGANIZED HEALTH CARE EDUCATION/TRAINING PROGRAM

## 2021-08-13 PROCEDURE — 85025 COMPLETE CBC W/AUTO DIFF WBC: CPT | Performed by: EMERGENCY MEDICINE

## 2021-08-13 PROCEDURE — 63600175 PHARM REV CODE 636 W HCPCS: Performed by: EMERGENCY MEDICINE

## 2021-08-13 PROCEDURE — 93010 EKG 12-LEAD: ICD-10-PCS | Mod: ,,, | Performed by: INTERNAL MEDICINE

## 2021-08-13 PROCEDURE — 82728 ASSAY OF FERRITIN: CPT | Performed by: EMERGENCY MEDICINE

## 2021-08-13 PROCEDURE — 63600175 PHARM REV CODE 636 W HCPCS: Performed by: STUDENT IN AN ORGANIZED HEALTH CARE EDUCATION/TRAINING PROGRAM

## 2021-08-13 PROCEDURE — 82550 ASSAY OF CK (CPK): CPT | Performed by: EMERGENCY MEDICINE

## 2021-08-13 PROCEDURE — 83605 ASSAY OF LACTIC ACID: CPT | Performed by: EMERGENCY MEDICINE

## 2021-08-13 PROCEDURE — 84145 PROCALCITONIN (PCT): CPT | Performed by: STUDENT IN AN ORGANIZED HEALTH CARE EDUCATION/TRAINING PROGRAM

## 2021-08-13 PROCEDURE — 84484 ASSAY OF TROPONIN QUANT: CPT | Performed by: EMERGENCY MEDICINE

## 2021-08-13 PROCEDURE — 27000207 HC ISOLATION

## 2021-08-13 PROCEDURE — 99223 1ST HOSP IP/OBS HIGH 75: CPT | Mod: ,,, | Performed by: OTOLARYNGOLOGY

## 2021-08-13 PROCEDURE — 99285 EMERGENCY DEPT VISIT HI MDM: CPT | Mod: CR,,, | Performed by: EMERGENCY MEDICINE

## 2021-08-13 PROCEDURE — 96374 THER/PROPH/DIAG INJ IV PUSH: CPT

## 2021-08-13 PROCEDURE — 84484 ASSAY OF TROPONIN QUANT: CPT | Mod: 91 | Performed by: STUDENT IN AN ORGANIZED HEALTH CARE EDUCATION/TRAINING PROGRAM

## 2021-08-13 PROCEDURE — 86140 C-REACTIVE PROTEIN: CPT | Performed by: EMERGENCY MEDICINE

## 2021-08-13 PROCEDURE — 93010 ELECTROCARDIOGRAM REPORT: CPT | Mod: ,,, | Performed by: INTERNAL MEDICINE

## 2021-08-13 PROCEDURE — 25000003 PHARM REV CODE 250: Performed by: STUDENT IN AN ORGANIZED HEALTH CARE EDUCATION/TRAINING PROGRAM

## 2021-08-13 PROCEDURE — 82306 VITAMIN D 25 HYDROXY: CPT | Performed by: STUDENT IN AN ORGANIZED HEALTH CARE EDUCATION/TRAINING PROGRAM

## 2021-08-13 PROCEDURE — 20600001 HC STEP DOWN PRIVATE ROOM

## 2021-08-13 RX ORDER — LANOLIN ALCOHOL/MO/W.PET/CERES
800 CREAM (GRAM) TOPICAL
Status: DISCONTINUED | OUTPATIENT
Start: 2021-08-13 | End: 2021-08-16 | Stop reason: HOSPADM

## 2021-08-13 RX ORDER — CHOLECALCIFEROL (VITAMIN D3) 25 MCG
1000 TABLET ORAL DAILY
Status: DISCONTINUED | OUTPATIENT
Start: 2021-08-14 | End: 2021-08-16 | Stop reason: HOSPADM

## 2021-08-13 RX ORDER — ENOXAPARIN SODIUM 150 MG/ML
1 INJECTION SUBCUTANEOUS
Status: DISCONTINUED | OUTPATIENT
Start: 2021-08-13 | End: 2021-08-15

## 2021-08-13 RX ORDER — BENZONATATE 100 MG/1
100 CAPSULE ORAL 3 TIMES DAILY PRN
Status: DISCONTINUED | OUTPATIENT
Start: 2021-08-13 | End: 2021-08-16 | Stop reason: HOSPADM

## 2021-08-13 RX ORDER — SODIUM,POTASSIUM PHOSPHATES 280-250MG
2 POWDER IN PACKET (EA) ORAL
Status: DISCONTINUED | OUTPATIENT
Start: 2021-08-13 | End: 2021-08-16 | Stop reason: HOSPADM

## 2021-08-13 RX ORDER — ASCORBIC ACID 500 MG
500 TABLET ORAL 2 TIMES DAILY
Status: DISCONTINUED | OUTPATIENT
Start: 2021-08-13 | End: 2021-08-16 | Stop reason: HOSPADM

## 2021-08-13 RX ORDER — HYDRALAZINE HYDROCHLORIDE 25 MG/1
25 TABLET, FILM COATED ORAL EVERY 8 HOURS PRN
Status: DISCONTINUED | OUTPATIENT
Start: 2021-08-13 | End: 2021-08-16 | Stop reason: HOSPADM

## 2021-08-13 RX ORDER — TALC
6 POWDER (GRAM) TOPICAL NIGHTLY PRN
Status: DISCONTINUED | OUTPATIENT
Start: 2021-08-13 | End: 2021-08-16 | Stop reason: HOSPADM

## 2021-08-13 RX ORDER — HYDROCODONE BITARTRATE AND ACETAMINOPHEN 5; 325 MG/1; MG/1
1 TABLET ORAL EVERY 6 HOURS PRN
Status: DISCONTINUED | OUTPATIENT
Start: 2021-08-13 | End: 2021-08-16 | Stop reason: HOSPADM

## 2021-08-13 RX ORDER — METOPROLOL SUCCINATE 100 MG/1
200 TABLET, EXTENDED RELEASE ORAL DAILY
Status: DISCONTINUED | OUTPATIENT
Start: 2021-08-13 | End: 2021-08-16 | Stop reason: HOSPADM

## 2021-08-13 RX ORDER — CHLORTHALIDONE 25 MG/1
50 TABLET ORAL DAILY
Status: DISCONTINUED | OUTPATIENT
Start: 2021-08-13 | End: 2021-08-16 | Stop reason: HOSPADM

## 2021-08-13 RX ORDER — ALBUTEROL SULFATE 90 UG/1
2 AEROSOL, METERED RESPIRATORY (INHALATION) EVERY 6 HOURS
Status: DISCONTINUED | OUTPATIENT
Start: 2021-08-13 | End: 2021-08-16 | Stop reason: HOSPADM

## 2021-08-13 RX ORDER — DEXAMETHASONE SODIUM PHOSPHATE 4 MG/ML
6 INJECTION, SOLUTION INTRA-ARTICULAR; INTRALESIONAL; INTRAMUSCULAR; INTRAVENOUS; SOFT TISSUE
Status: COMPLETED | OUTPATIENT
Start: 2021-08-13 | End: 2021-08-13

## 2021-08-13 RX ORDER — METOPROLOL SUCCINATE 100 MG/1
200 TABLET, EXTENDED RELEASE ORAL DAILY
Status: DISCONTINUED | OUTPATIENT
Start: 2021-08-14 | End: 2021-08-13

## 2021-08-13 RX ORDER — AMLODIPINE BESYLATE 10 MG/1
10 TABLET ORAL DAILY
Status: DISCONTINUED | OUTPATIENT
Start: 2021-08-14 | End: 2021-08-13

## 2021-08-13 RX ORDER — SODIUM CHLORIDE 0.9 % (FLUSH) 0.9 %
10 SYRINGE (ML) INJECTION
Status: DISCONTINUED | OUTPATIENT
Start: 2021-08-13 | End: 2021-08-16 | Stop reason: HOSPADM

## 2021-08-13 RX ORDER — ONDANSETRON 8 MG/1
8 TABLET, ORALLY DISINTEGRATING ORAL EVERY 8 HOURS PRN
Status: DISCONTINUED | OUTPATIENT
Start: 2021-08-13 | End: 2021-08-16 | Stop reason: HOSPADM

## 2021-08-13 RX ORDER — LOPERAMIDE HYDROCHLORIDE 2 MG/1
2 CAPSULE ORAL EVERY 6 HOURS PRN
Status: DISCONTINUED | OUTPATIENT
Start: 2021-08-13 | End: 2021-08-16 | Stop reason: HOSPADM

## 2021-08-13 RX ORDER — ACETAMINOPHEN 325 MG/1
650 TABLET ORAL EVERY 4 HOURS PRN
Status: DISCONTINUED | OUTPATIENT
Start: 2021-08-13 | End: 2021-08-16 | Stop reason: HOSPADM

## 2021-08-13 RX ORDER — AMLODIPINE BESYLATE 10 MG/1
10 TABLET ORAL DAILY
Status: DISCONTINUED | OUTPATIENT
Start: 2021-08-13 | End: 2021-08-16 | Stop reason: HOSPADM

## 2021-08-13 RX ORDER — ACETAMINOPHEN 325 MG/1
650 TABLET ORAL EVERY 8 HOURS PRN
Status: DISCONTINUED | OUTPATIENT
Start: 2021-08-13 | End: 2021-08-16 | Stop reason: HOSPADM

## 2021-08-13 RX ADMIN — CHLORTHALIDONE 50 MG: 25 TABLET ORAL at 11:08

## 2021-08-13 RX ADMIN — OXYCODONE HYDROCHLORIDE AND ACETAMINOPHEN 500 MG: 500 TABLET ORAL at 11:08

## 2021-08-13 RX ADMIN — REMDESIVIR 200 MG: 100 INJECTION, POWDER, LYOPHILIZED, FOR SOLUTION INTRAVENOUS at 07:08

## 2021-08-13 RX ADMIN — DEXAMETHASONE SODIUM PHOSPHATE 6 MG: 4 INJECTION INTRA-ARTICULAR; INTRALESIONAL; INTRAMUSCULAR; INTRAVENOUS; SOFT TISSUE at 02:08

## 2021-08-13 RX ADMIN — AMLODIPINE BESYLATE 10 MG: 10 TABLET ORAL at 11:08

## 2021-08-13 RX ADMIN — ENOXAPARIN SODIUM 135 MG: 150 INJECTION SUBCUTANEOUS at 06:08

## 2021-08-13 RX ADMIN — HYDRALAZINE HYDROCHLORIDE 25 MG: 25 TABLET, FILM COATED ORAL at 06:08

## 2021-08-13 RX ADMIN — METOPROLOL SUCCINATE 200 MG: 100 TABLET, EXTENDED RELEASE ORAL at 11:08

## 2021-08-14 PROBLEM — I16.1 HYPERTENSIVE EMERGENCY: Status: RESOLVED | Noted: 2019-07-17 | Resolved: 2021-08-14

## 2021-08-14 LAB
ALBUMIN SERPL BCP-MCNC: 3.5 G/DL (ref 3.5–5.2)
ALP SERPL-CCNC: 66 U/L (ref 55–135)
ALT SERPL W/O P-5'-P-CCNC: 97 U/L (ref 10–44)
ANION GAP SERPL CALC-SCNC: 10 MMOL/L (ref 8–16)
ANISOCYTOSIS BLD QL SMEAR: SLIGHT
AST SERPL-CCNC: 88 U/L (ref 10–40)
BASOPHILS # BLD AUTO: 0.01 K/UL (ref 0–0.2)
BASOPHILS NFR BLD: 0.2 % (ref 0–1.9)
BILIRUB SERPL-MCNC: 0.3 MG/DL (ref 0.1–1)
BUN SERPL-MCNC: 19 MG/DL (ref 6–20)
CALCIUM SERPL-MCNC: 9.3 MG/DL (ref 8.7–10.5)
CHLORIDE SERPL-SCNC: 106 MMOL/L (ref 95–110)
CO2 SERPL-SCNC: 25 MMOL/L (ref 23–29)
CREAT SERPL-MCNC: 1.2 MG/DL (ref 0.5–1.4)
DACRYOCYTES BLD QL SMEAR: ABNORMAL
DIFFERENTIAL METHOD: ABNORMAL
EOSINOPHIL # BLD AUTO: 0 K/UL (ref 0–0.5)
EOSINOPHIL NFR BLD: 0 % (ref 0–8)
ERYTHROCYTE [DISTWIDTH] IN BLOOD BY AUTOMATED COUNT: 15.1 % (ref 11.5–14.5)
EST. GFR  (AFRICAN AMERICAN): >60 ML/MIN/1.73 M^2
EST. GFR  (NON AFRICAN AMERICAN): >60 ML/MIN/1.73 M^2
GLUCOSE SERPL-MCNC: 142 MG/DL (ref 70–110)
HCT VFR BLD AUTO: 41.5 % (ref 40–54)
HGB BLD-MCNC: 13 G/DL (ref 14–18)
HYPOCHROMIA BLD QL SMEAR: ABNORMAL
IMM GRANULOCYTES # BLD AUTO: 0.08 K/UL (ref 0–0.04)
IMM GRANULOCYTES NFR BLD AUTO: 1.8 % (ref 0–0.5)
LYMPHOCYTES # BLD AUTO: 1 K/UL (ref 1–4.8)
LYMPHOCYTES NFR BLD: 23.6 % (ref 18–48)
MAGNESIUM SERPL-MCNC: 2.3 MG/DL (ref 1.6–2.6)
MCH RBC QN AUTO: 28.4 PG (ref 27–31)
MCHC RBC AUTO-ENTMCNC: 31.3 G/DL (ref 32–36)
MCV RBC AUTO: 91 FL (ref 82–98)
MONOCYTES # BLD AUTO: 0.4 K/UL (ref 0.3–1)
MONOCYTES NFR BLD: 8.5 % (ref 4–15)
NEUTROPHILS # BLD AUTO: 2.9 K/UL (ref 1.8–7.7)
NEUTROPHILS NFR BLD: 65.9 % (ref 38–73)
NRBC BLD-RTO: 0 /100 WBC
OVALOCYTES BLD QL SMEAR: ABNORMAL
PHOSPHATE SERPL-MCNC: 2.6 MG/DL (ref 2.7–4.5)
PLATELET # BLD AUTO: 397 K/UL (ref 150–450)
PMV BLD AUTO: 10 FL (ref 9.2–12.9)
POCT GLUCOSE: 169 MG/DL (ref 70–110)
POCT GLUCOSE: 223 MG/DL (ref 70–110)
POCT GLUCOSE: 239 MG/DL (ref 70–110)
POIKILOCYTOSIS BLD QL SMEAR: SLIGHT
POLYCHROMASIA BLD QL SMEAR: ABNORMAL
POTASSIUM SERPL-SCNC: 3.7 MMOL/L (ref 3.5–5.1)
PROT SERPL-MCNC: 8 G/DL (ref 6–8.4)
RBC # BLD AUTO: 4.57 M/UL (ref 4.6–6.2)
SODIUM SERPL-SCNC: 141 MMOL/L (ref 136–145)
TROPONIN I SERPL DL<=0.01 NG/ML-MCNC: 0.01 NG/ML (ref 0–0.03)
WBC # BLD AUTO: 4.33 K/UL (ref 3.9–12.7)

## 2021-08-14 PROCEDURE — 63600175 PHARM REV CODE 636 W HCPCS: Performed by: STUDENT IN AN ORGANIZED HEALTH CARE EDUCATION/TRAINING PROGRAM

## 2021-08-14 PROCEDURE — 27000221 HC OXYGEN, UP TO 24 HOURS

## 2021-08-14 PROCEDURE — 25000003 PHARM REV CODE 250: Performed by: STUDENT IN AN ORGANIZED HEALTH CARE EDUCATION/TRAINING PROGRAM

## 2021-08-14 PROCEDURE — 80053 COMPREHEN METABOLIC PANEL: CPT | Performed by: STUDENT IN AN ORGANIZED HEALTH CARE EDUCATION/TRAINING PROGRAM

## 2021-08-14 PROCEDURE — 99233 SBSQ HOSP IP/OBS HIGH 50: CPT | Mod: ,,, | Performed by: OTOLARYNGOLOGY

## 2021-08-14 PROCEDURE — 99233 PR SUBSEQUENT HOSPITAL CARE,LEVL III: ICD-10-PCS | Mod: ,,, | Performed by: OTOLARYNGOLOGY

## 2021-08-14 PROCEDURE — 25000242 PHARM REV CODE 250 ALT 637 W/ HCPCS: Performed by: STUDENT IN AN ORGANIZED HEALTH CARE EDUCATION/TRAINING PROGRAM

## 2021-08-14 PROCEDURE — 85025 COMPLETE CBC W/AUTO DIFF WBC: CPT | Performed by: STUDENT IN AN ORGANIZED HEALTH CARE EDUCATION/TRAINING PROGRAM

## 2021-08-14 PROCEDURE — 25000003 PHARM REV CODE 250: Performed by: PHYSICIAN ASSISTANT

## 2021-08-14 PROCEDURE — 83735 ASSAY OF MAGNESIUM: CPT | Performed by: STUDENT IN AN ORGANIZED HEALTH CARE EDUCATION/TRAINING PROGRAM

## 2021-08-14 PROCEDURE — 99900035 HC TECH TIME PER 15 MIN (STAT)

## 2021-08-14 PROCEDURE — 20600001 HC STEP DOWN PRIVATE ROOM

## 2021-08-14 PROCEDURE — 36415 COLL VENOUS BLD VENIPUNCTURE: CPT | Performed by: STUDENT IN AN ORGANIZED HEALTH CARE EDUCATION/TRAINING PROGRAM

## 2021-08-14 PROCEDURE — 84100 ASSAY OF PHOSPHORUS: CPT | Performed by: STUDENT IN AN ORGANIZED HEALTH CARE EDUCATION/TRAINING PROGRAM

## 2021-08-14 PROCEDURE — 27000207 HC ISOLATION

## 2021-08-14 PROCEDURE — 94761 N-INVAS EAR/PLS OXIMETRY MLT: CPT

## 2021-08-14 PROCEDURE — 94640 AIRWAY INHALATION TREATMENT: CPT

## 2021-08-14 RX ORDER — IBUPROFEN 200 MG
24 TABLET ORAL
Status: DISCONTINUED | OUTPATIENT
Start: 2021-08-14 | End: 2021-08-16 | Stop reason: HOSPADM

## 2021-08-14 RX ORDER — IBUPROFEN 200 MG
16 TABLET ORAL
Status: DISCONTINUED | OUTPATIENT
Start: 2021-08-14 | End: 2021-08-16 | Stop reason: HOSPADM

## 2021-08-14 RX ORDER — INSULIN ASPART 100 [IU]/ML
0-5 INJECTION, SOLUTION INTRAVENOUS; SUBCUTANEOUS
Status: DISCONTINUED | OUTPATIENT
Start: 2021-08-14 | End: 2021-08-16 | Stop reason: HOSPADM

## 2021-08-14 RX ORDER — GLUCAGON 1 MG
1 KIT INJECTION
Status: DISCONTINUED | OUTPATIENT
Start: 2021-08-14 | End: 2021-08-16 | Stop reason: HOSPADM

## 2021-08-14 RX ADMIN — ALBUTEROL SULFATE 2 PUFF: 108 AEROSOL, METERED RESPIRATORY (INHALATION) at 12:08

## 2021-08-14 RX ADMIN — ENOXAPARIN SODIUM 135 MG: 150 INJECTION SUBCUTANEOUS at 09:08

## 2021-08-14 RX ADMIN — ENOXAPARIN SODIUM 135 MG: 150 INJECTION SUBCUTANEOUS at 10:08

## 2021-08-14 RX ADMIN — INSULIN ASPART 1 UNITS: 100 INJECTION, SOLUTION INTRAVENOUS; SUBCUTANEOUS at 09:08

## 2021-08-14 RX ADMIN — THERA TABS 1 TABLET: TAB at 10:08

## 2021-08-14 RX ADMIN — REMDESIVIR 100 MG: 100 INJECTION, POWDER, LYOPHILIZED, FOR SOLUTION INTRAVENOUS at 08:08

## 2021-08-14 RX ADMIN — DEXAMETHASONE 6 MG: 4 TABLET ORAL at 10:08

## 2021-08-14 RX ADMIN — ALBUTEROL SULFATE 2 PUFF: 108 AEROSOL, METERED RESPIRATORY (INHALATION) at 06:08

## 2021-08-14 RX ADMIN — CHOLECALCIFEROL (VITAMIN D3) 25 MCG (1,000 UNIT) TABLET 1000 UNITS: TABLET at 10:08

## 2021-08-14 RX ADMIN — AMLODIPINE BESYLATE 10 MG: 10 TABLET ORAL at 08:08

## 2021-08-14 RX ADMIN — ALBUTEROL SULFATE 2 PUFF: 108 AEROSOL, METERED RESPIRATORY (INHALATION) at 02:08

## 2021-08-14 RX ADMIN — OXYCODONE HYDROCHLORIDE AND ACETAMINOPHEN 500 MG: 500 TABLET ORAL at 09:08

## 2021-08-14 RX ADMIN — ALBUTEROL SULFATE 2 PUFF: 108 AEROSOL, METERED RESPIRATORY (INHALATION) at 08:08

## 2021-08-14 RX ADMIN — OXYCODONE HYDROCHLORIDE AND ACETAMINOPHEN 500 MG: 500 TABLET ORAL at 08:08

## 2021-08-14 RX ADMIN — CHLORTHALIDONE 50 MG: 25 TABLET ORAL at 08:08

## 2021-08-14 RX ADMIN — METOPROLOL SUCCINATE 200 MG: 100 TABLET, EXTENDED RELEASE ORAL at 08:08

## 2021-08-14 RX ADMIN — INSULIN ASPART 2 UNITS: 100 INJECTION, SOLUTION INTRAVENOUS; SUBCUTANEOUS at 12:08

## 2021-08-15 PROBLEM — I10 ESSENTIAL HYPERTENSION: Status: ACTIVE | Noted: 2021-08-15

## 2021-08-15 PROBLEM — R73.03 PREDIABETES: Status: ACTIVE | Noted: 2021-08-15

## 2021-08-15 PROBLEM — J96.01 ACUTE HYPOXEMIC RESPIRATORY FAILURE: Status: ACTIVE | Noted: 2021-08-15

## 2021-08-15 PROBLEM — F43.20 ADJUSTMENT DISORDER: Status: ACTIVE | Noted: 2021-08-15

## 2021-08-15 PROBLEM — E78.5 HYPERLIPIDEMIA: Status: ACTIVE | Noted: 2021-08-15

## 2021-08-15 LAB
ALBUMIN SERPL BCP-MCNC: 3.3 G/DL (ref 3.5–5.2)
ALP SERPL-CCNC: 55 U/L (ref 55–135)
ALT SERPL W/O P-5'-P-CCNC: 87 U/L (ref 10–44)
ANION GAP SERPL CALC-SCNC: 9 MMOL/L (ref 8–16)
AST SERPL-CCNC: 46 U/L (ref 10–40)
BASOPHILS # BLD AUTO: 0.02 K/UL (ref 0–0.2)
BASOPHILS NFR BLD: 0.3 % (ref 0–1.9)
BILIRUB SERPL-MCNC: 0.3 MG/DL (ref 0.1–1)
BUN SERPL-MCNC: 21 MG/DL (ref 6–20)
CALCIUM SERPL-MCNC: 9.2 MG/DL (ref 8.7–10.5)
CHLORIDE SERPL-SCNC: 106 MMOL/L (ref 95–110)
CK SERPL-CCNC: 452 U/L (ref 20–200)
CO2 SERPL-SCNC: 25 MMOL/L (ref 23–29)
CREAT SERPL-MCNC: 1.2 MG/DL (ref 0.5–1.4)
D DIMER PPP IA.FEU-MCNC: 0.25 MG/L FEU
DIFFERENTIAL METHOD: ABNORMAL
EOSINOPHIL # BLD AUTO: 0 K/UL (ref 0–0.5)
EOSINOPHIL NFR BLD: 0 % (ref 0–8)
ERYTHROCYTE [DISTWIDTH] IN BLOOD BY AUTOMATED COUNT: 14.8 % (ref 11.5–14.5)
EST. GFR  (AFRICAN AMERICAN): >60 ML/MIN/1.73 M^2
EST. GFR  (NON AFRICAN AMERICAN): >60 ML/MIN/1.73 M^2
FACT X PPP CHRO-ACNC: 1.08 IU/ML (ref 0.3–0.7)
GLUCOSE SERPL-MCNC: 136 MG/DL (ref 70–110)
HCT VFR BLD AUTO: 40.6 % (ref 40–54)
HGB BLD-MCNC: 12.9 G/DL (ref 14–18)
IMM GRANULOCYTES # BLD AUTO: 0.19 K/UL (ref 0–0.04)
IMM GRANULOCYTES NFR BLD AUTO: 2.9 % (ref 0–0.5)
LYMPHOCYTES # BLD AUTO: 1.4 K/UL (ref 1–4.8)
LYMPHOCYTES NFR BLD: 21.5 % (ref 18–48)
MAGNESIUM SERPL-MCNC: 2.2 MG/DL (ref 1.6–2.6)
MCH RBC QN AUTO: 28.5 PG (ref 27–31)
MCHC RBC AUTO-ENTMCNC: 31.8 G/DL (ref 32–36)
MCV RBC AUTO: 90 FL (ref 82–98)
MONOCYTES # BLD AUTO: 0.8 K/UL (ref 0.3–1)
MONOCYTES NFR BLD: 11.7 % (ref 4–15)
NEUTROPHILS # BLD AUTO: 4.2 K/UL (ref 1.8–7.7)
NEUTROPHILS NFR BLD: 63.6 % (ref 38–73)
NRBC BLD-RTO: 0 /100 WBC
PHOSPHATE SERPL-MCNC: 3.2 MG/DL (ref 2.7–4.5)
PLATELET # BLD AUTO: 402 K/UL (ref 150–450)
PMV BLD AUTO: 9.8 FL (ref 9.2–12.9)
POCT GLUCOSE: 141 MG/DL (ref 70–110)
POCT GLUCOSE: 170 MG/DL (ref 70–110)
POCT GLUCOSE: 232 MG/DL (ref 70–110)
POTASSIUM SERPL-SCNC: 3.9 MMOL/L (ref 3.5–5.1)
PROT SERPL-MCNC: 7.2 G/DL (ref 6–8.4)
RBC # BLD AUTO: 4.52 M/UL (ref 4.6–6.2)
SODIUM SERPL-SCNC: 140 MMOL/L (ref 136–145)
WBC # BLD AUTO: 6.65 K/UL (ref 3.9–12.7)

## 2021-08-15 PROCEDURE — 82550 ASSAY OF CK (CPK): CPT | Performed by: STUDENT IN AN ORGANIZED HEALTH CARE EDUCATION/TRAINING PROGRAM

## 2021-08-15 PROCEDURE — 25000003 PHARM REV CODE 250: Performed by: STUDENT IN AN ORGANIZED HEALTH CARE EDUCATION/TRAINING PROGRAM

## 2021-08-15 PROCEDURE — 63600175 PHARM REV CODE 636 W HCPCS: Performed by: OTOLARYNGOLOGY

## 2021-08-15 PROCEDURE — 27000207 HC ISOLATION

## 2021-08-15 PROCEDURE — 85520 HEPARIN ASSAY: CPT | Performed by: OTOLARYNGOLOGY

## 2021-08-15 PROCEDURE — 20600001 HC STEP DOWN PRIVATE ROOM

## 2021-08-15 PROCEDURE — 36415 COLL VENOUS BLD VENIPUNCTURE: CPT | Performed by: OTOLARYNGOLOGY

## 2021-08-15 PROCEDURE — 99233 SBSQ HOSP IP/OBS HIGH 50: CPT | Mod: ,,, | Performed by: OTOLARYNGOLOGY

## 2021-08-15 PROCEDURE — 94640 AIRWAY INHALATION TREATMENT: CPT

## 2021-08-15 PROCEDURE — 94761 N-INVAS EAR/PLS OXIMETRY MLT: CPT

## 2021-08-15 PROCEDURE — 27000221 HC OXYGEN, UP TO 24 HOURS

## 2021-08-15 PROCEDURE — 84100 ASSAY OF PHOSPHORUS: CPT | Performed by: STUDENT IN AN ORGANIZED HEALTH CARE EDUCATION/TRAINING PROGRAM

## 2021-08-15 PROCEDURE — 80053 COMPREHEN METABOLIC PANEL: CPT | Performed by: STUDENT IN AN ORGANIZED HEALTH CARE EDUCATION/TRAINING PROGRAM

## 2021-08-15 PROCEDURE — 63600175 PHARM REV CODE 636 W HCPCS: Performed by: STUDENT IN AN ORGANIZED HEALTH CARE EDUCATION/TRAINING PROGRAM

## 2021-08-15 PROCEDURE — 85025 COMPLETE CBC W/AUTO DIFF WBC: CPT | Performed by: STUDENT IN AN ORGANIZED HEALTH CARE EDUCATION/TRAINING PROGRAM

## 2021-08-15 PROCEDURE — 99233 PR SUBSEQUENT HOSPITAL CARE,LEVL III: ICD-10-PCS | Mod: ,,, | Performed by: OTOLARYNGOLOGY

## 2021-08-15 PROCEDURE — 25000003 PHARM REV CODE 250: Performed by: PHYSICIAN ASSISTANT

## 2021-08-15 PROCEDURE — 83735 ASSAY OF MAGNESIUM: CPT | Performed by: STUDENT IN AN ORGANIZED HEALTH CARE EDUCATION/TRAINING PROGRAM

## 2021-08-15 PROCEDURE — 85379 FIBRIN DEGRADATION QUANT: CPT | Performed by: STUDENT IN AN ORGANIZED HEALTH CARE EDUCATION/TRAINING PROGRAM

## 2021-08-15 PROCEDURE — 99900035 HC TECH TIME PER 15 MIN (STAT)

## 2021-08-15 PROCEDURE — 36415 COLL VENOUS BLD VENIPUNCTURE: CPT | Performed by: STUDENT IN AN ORGANIZED HEALTH CARE EDUCATION/TRAINING PROGRAM

## 2021-08-15 RX ORDER — ENOXAPARIN SODIUM 150 MG/ML
120 INJECTION SUBCUTANEOUS
Status: DISCONTINUED | OUTPATIENT
Start: 2021-08-15 | End: 2021-08-16 | Stop reason: HOSPADM

## 2021-08-15 RX ADMIN — REMDESIVIR 100 MG: 100 INJECTION, POWDER, LYOPHILIZED, FOR SOLUTION INTRAVENOUS at 08:08

## 2021-08-15 RX ADMIN — INSULIN ASPART 2 UNITS: 100 INJECTION, SOLUTION INTRAVENOUS; SUBCUTANEOUS at 05:08

## 2021-08-15 RX ADMIN — AMLODIPINE BESYLATE 10 MG: 10 TABLET ORAL at 08:08

## 2021-08-15 RX ADMIN — ALBUTEROL SULFATE 2 PUFF: 108 AEROSOL, METERED RESPIRATORY (INHALATION) at 07:08

## 2021-08-15 RX ADMIN — ENOXAPARIN SODIUM 135 MG: 150 INJECTION SUBCUTANEOUS at 08:08

## 2021-08-15 RX ADMIN — ALBUTEROL SULFATE 2 PUFF: 108 AEROSOL, METERED RESPIRATORY (INHALATION) at 01:08

## 2021-08-15 RX ADMIN — INSULIN ASPART 1 UNITS: 100 INJECTION, SOLUTION INTRAVENOUS; SUBCUTANEOUS at 09:08

## 2021-08-15 RX ADMIN — THERA TABS 1 TABLET: TAB at 08:08

## 2021-08-15 RX ADMIN — OXYCODONE HYDROCHLORIDE AND ACETAMINOPHEN 500 MG: 500 TABLET ORAL at 08:08

## 2021-08-15 RX ADMIN — CHOLECALCIFEROL (VITAMIN D3) 25 MCG (1,000 UNIT) TABLET 1000 UNITS: TABLET at 08:08

## 2021-08-15 RX ADMIN — BENZONATATE 100 MG: 100 CAPSULE ORAL at 05:08

## 2021-08-15 RX ADMIN — ENOXAPARIN SODIUM 120 MG: 120 INJECTION SUBCUTANEOUS at 08:08

## 2021-08-15 RX ADMIN — DEXAMETHASONE 6 MG: 4 TABLET ORAL at 08:08

## 2021-08-15 RX ADMIN — CHLORTHALIDONE 50 MG: 25 TABLET ORAL at 08:08

## 2021-08-16 VITALS
RESPIRATION RATE: 18 BRPM | OXYGEN SATURATION: 92 % | DIASTOLIC BLOOD PRESSURE: 91 MMHG | SYSTOLIC BLOOD PRESSURE: 157 MMHG | HEIGHT: 74 IN | WEIGHT: 300.5 LBS | TEMPERATURE: 98 F | BODY MASS INDEX: 38.56 KG/M2 | HEART RATE: 71 BPM

## 2021-08-16 LAB
ALBUMIN SERPL BCP-MCNC: 3.4 G/DL (ref 3.5–5.2)
ALP SERPL-CCNC: 53 U/L (ref 55–135)
ALT SERPL W/O P-5'-P-CCNC: 90 U/L (ref 10–44)
ANION GAP SERPL CALC-SCNC: 10 MMOL/L (ref 8–16)
AST SERPL-CCNC: 50 U/L (ref 10–40)
BASOPHILS # BLD AUTO: 0.03 K/UL (ref 0–0.2)
BASOPHILS NFR BLD: 0.4 % (ref 0–1.9)
BILIRUB SERPL-MCNC: 0.4 MG/DL (ref 0.1–1)
BUN SERPL-MCNC: 25 MG/DL (ref 6–20)
CALCIUM SERPL-MCNC: 9.4 MG/DL (ref 8.7–10.5)
CHLORIDE SERPL-SCNC: 106 MMOL/L (ref 95–110)
CO2 SERPL-SCNC: 24 MMOL/L (ref 23–29)
CREAT SERPL-MCNC: 1.2 MG/DL (ref 0.5–1.4)
DIFFERENTIAL METHOD: ABNORMAL
EOSINOPHIL # BLD AUTO: 0 K/UL (ref 0–0.5)
EOSINOPHIL NFR BLD: 0.1 % (ref 0–8)
ERYTHROCYTE [DISTWIDTH] IN BLOOD BY AUTOMATED COUNT: 14.9 % (ref 11.5–14.5)
EST. GFR  (AFRICAN AMERICAN): >60 ML/MIN/1.73 M^2
EST. GFR  (NON AFRICAN AMERICAN): >60 ML/MIN/1.73 M^2
GLUCOSE SERPL-MCNC: 127 MG/DL (ref 70–110)
HCT VFR BLD AUTO: 42.6 % (ref 40–54)
HGB BLD-MCNC: 13.5 G/DL (ref 14–18)
IMM GRANULOCYTES # BLD AUTO: 0.4 K/UL (ref 0–0.04)
IMM GRANULOCYTES NFR BLD AUTO: 4.7 % (ref 0–0.5)
LYMPHOCYTES # BLD AUTO: 2 K/UL (ref 1–4.8)
LYMPHOCYTES NFR BLD: 24.2 % (ref 18–48)
MAGNESIUM SERPL-MCNC: 2.1 MG/DL (ref 1.6–2.6)
MCH RBC QN AUTO: 28.1 PG (ref 27–31)
MCHC RBC AUTO-ENTMCNC: 31.7 G/DL (ref 32–36)
MCV RBC AUTO: 89 FL (ref 82–98)
MONOCYTES # BLD AUTO: 0.8 K/UL (ref 0.3–1)
MONOCYTES NFR BLD: 9.6 % (ref 4–15)
NEUTROPHILS # BLD AUTO: 5.2 K/UL (ref 1.8–7.7)
NEUTROPHILS NFR BLD: 61 % (ref 38–73)
NRBC BLD-RTO: 0 /100 WBC
PHOSPHATE SERPL-MCNC: 3.2 MG/DL (ref 2.7–4.5)
PLATELET # BLD AUTO: 508 K/UL (ref 150–450)
PMV BLD AUTO: 9.7 FL (ref 9.2–12.9)
POCT GLUCOSE: 139 MG/DL (ref 70–110)
POCT GLUCOSE: 204 MG/DL (ref 70–110)
POTASSIUM SERPL-SCNC: 3.6 MMOL/L (ref 3.5–5.1)
PROT SERPL-MCNC: 7.3 G/DL (ref 6–8.4)
RBC # BLD AUTO: 4.81 M/UL (ref 4.6–6.2)
SODIUM SERPL-SCNC: 140 MMOL/L (ref 136–145)
WBC # BLD AUTO: 8.44 K/UL (ref 3.9–12.7)

## 2021-08-16 PROCEDURE — 36415 COLL VENOUS BLD VENIPUNCTURE: CPT | Performed by: STUDENT IN AN ORGANIZED HEALTH CARE EDUCATION/TRAINING PROGRAM

## 2021-08-16 PROCEDURE — 25000003 PHARM REV CODE 250: Performed by: PHYSICIAN ASSISTANT

## 2021-08-16 PROCEDURE — 63600175 PHARM REV CODE 636 W HCPCS: Performed by: STUDENT IN AN ORGANIZED HEALTH CARE EDUCATION/TRAINING PROGRAM

## 2021-08-16 PROCEDURE — 94640 AIRWAY INHALATION TREATMENT: CPT

## 2021-08-16 PROCEDURE — 80053 COMPREHEN METABOLIC PANEL: CPT | Performed by: STUDENT IN AN ORGANIZED HEALTH CARE EDUCATION/TRAINING PROGRAM

## 2021-08-16 PROCEDURE — 94761 N-INVAS EAR/PLS OXIMETRY MLT: CPT

## 2021-08-16 PROCEDURE — 63600175 PHARM REV CODE 636 W HCPCS: Performed by: OTOLARYNGOLOGY

## 2021-08-16 PROCEDURE — 85025 COMPLETE CBC W/AUTO DIFF WBC: CPT | Performed by: STUDENT IN AN ORGANIZED HEALTH CARE EDUCATION/TRAINING PROGRAM

## 2021-08-16 PROCEDURE — 25000003 PHARM REV CODE 250: Performed by: STUDENT IN AN ORGANIZED HEALTH CARE EDUCATION/TRAINING PROGRAM

## 2021-08-16 PROCEDURE — 83735 ASSAY OF MAGNESIUM: CPT | Performed by: STUDENT IN AN ORGANIZED HEALTH CARE EDUCATION/TRAINING PROGRAM

## 2021-08-16 PROCEDURE — 84100 ASSAY OF PHOSPHORUS: CPT | Performed by: STUDENT IN AN ORGANIZED HEALTH CARE EDUCATION/TRAINING PROGRAM

## 2021-08-16 RX ORDER — ONDANSETRON 8 MG/1
8 TABLET, ORALLY DISINTEGRATING ORAL EVERY 8 HOURS PRN
Qty: 12 TABLET | Refills: 0 | Status: SHIPPED | OUTPATIENT
Start: 2021-08-16

## 2021-08-16 RX ORDER — BENZONATATE 100 MG/1
100 CAPSULE ORAL 3 TIMES DAILY PRN
Qty: 15 CAPSULE | Refills: 1 | Status: SHIPPED | OUTPATIENT
Start: 2021-08-16 | End: 2021-08-26

## 2021-08-16 RX ADMIN — AMLODIPINE BESYLATE 10 MG: 10 TABLET ORAL at 08:08

## 2021-08-16 RX ADMIN — REMDESIVIR 100 MG: 100 INJECTION, POWDER, LYOPHILIZED, FOR SOLUTION INTRAVENOUS at 08:08

## 2021-08-16 RX ADMIN — ALBUTEROL SULFATE 2 PUFF: 108 AEROSOL, METERED RESPIRATORY (INHALATION) at 01:08

## 2021-08-16 RX ADMIN — ALBUTEROL SULFATE 2 PUFF: 108 AEROSOL, METERED RESPIRATORY (INHALATION) at 06:08

## 2021-08-16 RX ADMIN — THERA TABS 1 TABLET: TAB at 08:08

## 2021-08-16 RX ADMIN — ENOXAPARIN SODIUM 120 MG: 120 INJECTION SUBCUTANEOUS at 08:08

## 2021-08-16 RX ADMIN — ALBUTEROL SULFATE 2 PUFF: 108 AEROSOL, METERED RESPIRATORY (INHALATION) at 12:08

## 2021-08-16 RX ADMIN — CHOLECALCIFEROL (VITAMIN D3) 25 MCG (1,000 UNIT) TABLET 1000 UNITS: TABLET at 08:08

## 2021-08-16 RX ADMIN — METOPROLOL SUCCINATE 200 MG: 100 TABLET, EXTENDED RELEASE ORAL at 08:08

## 2021-08-16 RX ADMIN — BENZONATATE 100 MG: 100 CAPSULE ORAL at 08:08

## 2021-08-16 RX ADMIN — OXYCODONE HYDROCHLORIDE AND ACETAMINOPHEN 500 MG: 500 TABLET ORAL at 08:08

## 2021-08-16 RX ADMIN — DEXAMETHASONE 6 MG: 4 TABLET ORAL at 08:08

## 2021-08-16 RX ADMIN — CHLORTHALIDONE 50 MG: 25 TABLET ORAL at 08:08

## 2021-08-17 ENCOUNTER — TELEPHONE (OUTPATIENT)
Dept: FAMILY MEDICINE | Facility: CLINIC | Age: 51
End: 2021-08-17

## 2021-08-18 ENCOUNTER — TELEPHONE (OUTPATIENT)
Dept: FAMILY MEDICINE | Facility: CLINIC | Age: 51
End: 2021-08-18

## 2021-08-18 LAB
BACTERIA BLD CULT: NORMAL
BACTERIA BLD CULT: NORMAL

## 2021-08-23 ENCOUNTER — HOSPITAL ENCOUNTER (EMERGENCY)
Facility: HOSPITAL | Age: 51
Discharge: HOME | End: 2021-08-23
Attending: EMERGENCY MEDICINE
Payer: OTHER GOVERNMENT

## 2021-08-23 VITALS
HEART RATE: 80 BPM | DIASTOLIC BLOOD PRESSURE: 93 MMHG | OXYGEN SATURATION: 99 % | TEMPERATURE: 99 F | BODY MASS INDEX: 35.29 KG/M2 | WEIGHT: 275 LBS | RESPIRATION RATE: 18 BRPM | SYSTOLIC BLOOD PRESSURE: 160 MMHG | HEIGHT: 74 IN

## 2021-08-23 DIAGNOSIS — M54.9 BACK PAIN, UNSPECIFIED BACK LOCATION, UNSPECIFIED BACK PAIN LATERALITY, UNSPECIFIED CHRONICITY: Primary | ICD-10-CM

## 2021-08-23 PROCEDURE — 99283 EMERGENCY DEPT VISIT LOW MDM: CPT | Mod: 25

## 2021-08-23 PROCEDURE — 99284 EMERGENCY DEPT VISIT MOD MDM: CPT | Mod: ,,, | Performed by: EMERGENCY MEDICINE

## 2021-08-23 PROCEDURE — 99284 PR EMERGENCY DEPT VISIT,LEVEL IV: ICD-10-PCS | Mod: ,,, | Performed by: EMERGENCY MEDICINE

## 2021-08-23 PROCEDURE — 25000003 PHARM REV CODE 250: Performed by: STUDENT IN AN ORGANIZED HEALTH CARE EDUCATION/TRAINING PROGRAM

## 2021-08-23 RX ORDER — NAPROXEN 500 MG/1
500 TABLET ORAL
Status: COMPLETED | OUTPATIENT
Start: 2021-08-23 | End: 2021-08-23

## 2021-08-23 RX ORDER — ACETAMINOPHEN 500 MG
1000 TABLET ORAL
Status: COMPLETED | OUTPATIENT
Start: 2021-08-23 | End: 2021-08-23

## 2021-08-23 RX ORDER — LIDOCAINE 50 MG/G
2 PATCH TOPICAL
Status: DISCONTINUED | OUTPATIENT
Start: 2021-08-23 | End: 2021-08-23 | Stop reason: HOSPADM

## 2021-08-23 RX ORDER — NAPROXEN 500 MG/1
500 TABLET ORAL 2 TIMES DAILY WITH MEALS
Qty: 28 TABLET | Refills: 0 | Status: SHIPPED | OUTPATIENT
Start: 2021-08-23 | End: 2021-09-06

## 2021-08-23 RX ADMIN — LIDOCAINE 2 PATCH: 50 PATCH TOPICAL at 07:08

## 2021-08-23 RX ADMIN — ACETAMINOPHEN 1000 MG: 500 TABLET ORAL at 07:08

## 2021-08-23 RX ADMIN — NAPROXEN 500 MG: 500 TABLET ORAL at 07:08

## 2021-09-22 ENCOUNTER — OFFICE VISIT (OUTPATIENT)
Dept: FAMILY MEDICINE | Facility: CLINIC | Age: 51
End: 2021-09-22
Payer: OTHER GOVERNMENT

## 2021-09-22 VITALS — SYSTOLIC BLOOD PRESSURE: 136 MMHG | DIASTOLIC BLOOD PRESSURE: 85 MMHG

## 2021-09-22 DIAGNOSIS — I10 HYPERTENSION, ESSENTIAL: Primary | ICD-10-CM

## 2021-09-22 PROCEDURE — 99213 PR OFFICE/OUTPT VISIT, EST, LEVL III, 20-29 MIN: ICD-10-PCS | Mod: 95,,, | Performed by: FAMILY MEDICINE

## 2021-09-22 PROCEDURE — 99213 OFFICE O/P EST LOW 20 MIN: CPT | Mod: 95,,, | Performed by: FAMILY MEDICINE

## 2021-09-22 RX ORDER — CARVEDILOL 25 MG/1
25 TABLET ORAL 2 TIMES DAILY WITH MEALS
Qty: 60 TABLET | Refills: 0 | Status: SHIPPED | OUTPATIENT
Start: 2021-09-22 | End: 2021-11-03

## 2021-10-04 ENCOUNTER — PATIENT MESSAGE (OUTPATIENT)
Dept: ADMINISTRATIVE | Facility: HOSPITAL | Age: 51
End: 2021-10-04

## 2021-10-06 ENCOUNTER — PATIENT MESSAGE (OUTPATIENT)
Dept: FAMILY MEDICINE | Facility: CLINIC | Age: 51
End: 2021-10-06

## 2021-10-08 ENCOUNTER — PATIENT OUTREACH (OUTPATIENT)
Dept: ADMINISTRATIVE | Facility: HOSPITAL | Age: 51
End: 2021-10-08

## 2021-11-26 ENCOUNTER — LAB VISIT (OUTPATIENT)
Dept: LAB | Facility: HOSPITAL | Age: 51
End: 2021-11-26
Attending: FAMILY MEDICINE
Payer: OTHER GOVERNMENT

## 2021-11-26 DIAGNOSIS — Z12.12 SCREENING FOR COLORECTAL CANCER: ICD-10-CM

## 2021-11-26 DIAGNOSIS — Z12.11 SCREENING FOR COLORECTAL CANCER: ICD-10-CM

## 2021-11-26 PROCEDURE — 82274 ASSAY TEST FOR BLOOD FECAL: CPT | Performed by: FAMILY MEDICINE

## 2021-12-02 LAB — HEMOCCULT STL QL IA: NEGATIVE

## 2021-12-14 ENCOUNTER — CLINICAL SUPPORT (OUTPATIENT)
Dept: URGENT CARE | Facility: CLINIC | Age: 51
End: 2021-12-14
Payer: OTHER GOVERNMENT

## 2021-12-14 DIAGNOSIS — Z11.59 SCREENING FOR VIRAL DISEASE: Primary | ICD-10-CM

## 2021-12-14 LAB
CTP QC/QA: YES
SARS-COV-2 RDRP RESP QL NAA+PROBE: NEGATIVE

## 2021-12-14 PROCEDURE — U0002 COVID-19 LAB TEST NON-CDC: HCPCS | Mod: QW,S$GLB,, | Performed by: NURSE PRACTITIONER

## 2021-12-14 PROCEDURE — U0002: ICD-10-PCS | Mod: QW,S$GLB,, | Performed by: NURSE PRACTITIONER

## 2021-12-16 ENCOUNTER — CLINICAL SUPPORT (OUTPATIENT)
Dept: URGENT CARE | Facility: CLINIC | Age: 51
End: 2021-12-16
Payer: OTHER GOVERNMENT

## 2021-12-16 DIAGNOSIS — Z20.822 ENCOUNTER FOR LABORATORY TESTING FOR COVID-19 VIRUS: Primary | ICD-10-CM

## 2021-12-16 LAB
CTP QC/QA: YES
SARS-COV-2 RDRP RESP QL NAA+PROBE: NEGATIVE

## 2021-12-16 PROCEDURE — U0002: ICD-10-PCS | Mod: QW,S$GLB,, | Performed by: PHYSICIAN ASSISTANT

## 2021-12-16 PROCEDURE — U0002 COVID-19 LAB TEST NON-CDC: HCPCS | Mod: QW,S$GLB,, | Performed by: PHYSICIAN ASSISTANT

## 2022-01-12 LAB
LEFT EYE DM RETINOPATHY: NEGATIVE
RIGHT EYE DM RETINOPATHY: NEGATIVE

## 2022-01-26 ENCOUNTER — PATIENT OUTREACH (OUTPATIENT)
Dept: ADMINISTRATIVE | Facility: HOSPITAL | Age: 52
End: 2022-01-26
Payer: OTHER GOVERNMENT

## 2022-01-26 DIAGNOSIS — E66.01 CLASS 2 SEVERE OBESITY DUE TO EXCESS CALORIES WITH SERIOUS COMORBIDITY AND BODY MASS INDEX (BMI) OF 38.0 TO 38.9 IN ADULT: Primary | ICD-10-CM

## 2022-01-26 DIAGNOSIS — I10 HYPERTENSION, ESSENTIAL: Primary | ICD-10-CM

## 2022-01-26 DIAGNOSIS — E11.9 DIABETES MELLITUS WITHOUT COMPLICATION: ICD-10-CM

## 2022-01-26 DIAGNOSIS — E78.5 DYSLIPIDEMIA: ICD-10-CM

## 2022-01-26 DIAGNOSIS — D72.819 LEUKOPENIA, UNSPECIFIED TYPE: ICD-10-CM

## 2022-01-26 NOTE — LETTER
AUTHORIZATION FOR RELEASE OF   CONFIDENTIAL INFORMATION    Dear [facility],    We are seeing Jonah Gordon, date of birth 1970, in the clinic at [facility]. Tayo Chin Jr, MD is the patient's PCP. Jonah Gordon has an outstanding lab/procedure at the time we reviewed his chart. In order to help keep his health information updated, he has authorized us to request the following medical record(s):        (  )  MAMMOGRAM                                      (  )  COLONOSCOPY      (  )  PAP SMEAR                                          (  )  OUTSIDE LAB RESULTS     (  )  DEXA SCAN                                          ( X ) DIABETIC EYE EXAM            (  )  FOOT EXAM                                          (  )  ENTIRE RECORD     (  )  OUTSIDE IMMUNIZATIONS                 (  )  _______________         Please fax records to [facility], Cesar R Roque Jr, MD, FAX [phone]   [address] [facility] [address]       If you have any questions, please contact Christina Apodaca MA,[facility] at [phone].              Patient Name: Jonah Gordon  : 1970  Patient Phone #: [phone]

## 2022-02-01 ENCOUNTER — PATIENT OUTREACH (OUTPATIENT)
Dept: ADMINISTRATIVE | Facility: OTHER | Age: 52
End: 2022-02-01
Payer: OTHER GOVERNMENT

## 2022-02-01 NOTE — PROGRESS NOTES
Care Everywhere: updated  Immunization: updated  Health Maintenance: updated  Media Review: review for outside eye exam report   Legacy Review:   DIS:  Order placed:   Upcoming appts:  EFAX:  Task Tickets:  Referrals:

## 2022-02-02 ENCOUNTER — OFFICE VISIT (OUTPATIENT)
Dept: SLEEP MEDICINE | Facility: CLINIC | Age: 52
End: 2022-02-02
Payer: OTHER GOVERNMENT

## 2022-02-02 VITALS
SYSTOLIC BLOOD PRESSURE: 144 MMHG | WEIGHT: 310.44 LBS | HEIGHT: 74 IN | BODY MASS INDEX: 39.84 KG/M2 | DIASTOLIC BLOOD PRESSURE: 93 MMHG | HEART RATE: 68 BPM

## 2022-02-02 DIAGNOSIS — I10 ESSENTIAL HYPERTENSION: Primary | ICD-10-CM

## 2022-02-02 DIAGNOSIS — U07.1 COVID-19: ICD-10-CM

## 2022-02-02 DIAGNOSIS — G47.30 SLEEP APNEA, UNSPECIFIED TYPE: ICD-10-CM

## 2022-02-02 PROCEDURE — 99214 OFFICE O/P EST MOD 30 MIN: CPT | Mod: S$PBB,,, | Performed by: NURSE PRACTITIONER

## 2022-02-02 PROCEDURE — 99999 PR PBB SHADOW E&M-EST. PATIENT-LVL III: CPT | Mod: PBBFAC,,, | Performed by: NURSE PRACTITIONER

## 2022-02-02 PROCEDURE — 99213 OFFICE O/P EST LOW 20 MIN: CPT | Mod: PBBFAC | Performed by: NURSE PRACTITIONER

## 2022-02-02 PROCEDURE — 99214 PR OFFICE/OUTPT VISIT, EST, LEVL IV, 30-39 MIN: ICD-10-PCS | Mod: S$PBB,,, | Performed by: NURSE PRACTITIONER

## 2022-02-02 PROCEDURE — 99999 PR PBB SHADOW E&M-EST. PATIENT-LVL III: ICD-10-PCS | Mod: PBBFAC,,, | Performed by: NURSE PRACTITIONER

## 2022-02-02 NOTE — PROGRESS NOTES
"Self referred, sent by [facility]    CHIEF COMPLAINT: Snoring    HISTORY OF PRESENT ILLNESS:He has never had a sleep study. +snores mainly when very exhausted, its not disruptive to his spouse. Denies witnessed apneic pauses. Denies am headaches. Sleeps with  2pillows. Sleep is refreshing and denies excessive daytime sleepiness. 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 BP 1/31/21 was 129/79. Takes most of his bp meds before bed. Nocturia 1x.     Seasonal allergies  On todays [place] Sleepiness Scale the patient scores a 5/24.       FAMILY HISTORY: Dad had GLENN  SOCIAL HISTORY: , [employer] (only operates vehicles in emergency situations, former [employer])    PHYSICAL EXAM:   BP (!) 144/93 (BP Location: Left arm, Patient Position: Sitting, BP Method: Large (Automatic))   Pulse 68   Ht 6' 2" (1.88 m)   Wt (!) 140.8 kg (310 lb 6.5 oz)   BMI 39.85 kg/m²   GENERAL: Obese body habitus, well groomed     ASSESSMENT:   Unspecified Sleep Apnea, with symptoms of snoring, disrupted sleep and infrequent daytime sleepiness, with medical comorbidities of obesity, hypertension. Warrants further investigation for untreated sleep apnea.     PLAN:   1. Polysomnogram, discussed plan of care (please EXPEDITE auth/scheduling/results, HAS CDL)   2. Discussed etiology of GLENN and potential ramifications of untreated GLENN, including stroke, heart disease, HTN.  We discussed potential treatment options, which could include weight loss, continuous positive airway pressure (CPAP-definitive), mandibular advancement splint by dentist  3. see PCP re HTN mgt/continue meds    "

## 2022-02-11 ENCOUNTER — HOSPITAL ENCOUNTER (OUTPATIENT)
Dept: PREADMISSION TESTING | Facility: HOSPITAL | Age: 52
Discharge: HOME | End: 2022-02-11
Attending: INTERNAL MEDICINE
Payer: OTHER GOVERNMENT

## 2022-02-11 DIAGNOSIS — G47.30 SLEEP APNEA, UNSPECIFIED TYPE: ICD-10-CM

## 2022-02-11 LAB — SARS-COV-2 RDRP RESP QL NAA+PROBE: NEGATIVE

## 2022-02-11 PROCEDURE — U0002 COVID-19 LAB TEST NON-CDC: HCPCS | Performed by: NURSE PRACTITIONER

## 2022-02-14 ENCOUNTER — HOSPITAL ENCOUNTER (OUTPATIENT)
Dept: SLEEP MEDICINE | Facility: HOSPITAL | Age: 52
Discharge: HOME | End: 2022-02-14
Attending: NURSE PRACTITIONER
Payer: OTHER GOVERNMENT

## 2022-02-14 DIAGNOSIS — G47.30 SLEEP APNEA, UNSPECIFIED TYPE: ICD-10-CM

## 2022-02-14 PROCEDURE — 95811 POLYSOM 6/>YRS CPAP 4/> PARM: CPT

## 2022-02-15 NOTE — PROGRESS NOTES
A Split night study was performed on Jonah Gordon. The entire procedure was explained, including Bio calibration procedure, patient was informed that there may be a need to enter the room during the night to fix lead or make adjustments to the equipment. Questions were answered prior to start of study. He was given instructions on how to call out for help including how to use the nurse call unit in the bathroom. Patient was given [employer] phone number to call if patient needed tech for anything, in case tech was out of tech room.  Patient education was performed. This includes the possible use of CPAP machine and different CPAP masks. He was given the after visit summary.   The lowest oxygen saturation observed during sleep was 83%   He met criteria for a split night study  The EKG appeared to be NSR  Technical difficulties during the study: Natus system shut down had to restart system. Had to contact IT and [employer]us to bring review screen back and fix pulse rate channel. Changed nasal cannula 2 times, cut prongs and taped cannula to patients face, channel still was low at times with and without events. Did not want to keep waking pt up to fix cannula. Pt complained about sleeping supine and said it was uncomfortable and that he sleeps on his side at home.Head of bed was raised 20% per pt request  then pt requested bed to be raised 15 % more  The patient was titrated from 5 cm H2O to 5 Cm H2O, the switched to BIPAP 8/4.   Eson nasal mask Large was used with chin strap, humidity, and EPR. The mask was changed to Airfit P10 nasal pillows due to patient not being able to tolerate nasal mask due to claustrophobia. Pt took off nasal pillows saying he could not tolerate mask and pressure of 5 cm H2O. Gave pt education on risk of untreated sleep apnea and let pt sit with mask on and no pressure. Switched to BiPAP 8/4 from 5 cm H2O, pt took mask off and refused CPAP and BIPAP.On call Dr Sandoval  was called and advised to let  study run as a PSG without CPAP and BIPAP. The remaining of the study was ran as a PSG.

## 2022-02-25 ENCOUNTER — TELEPHONE (OUTPATIENT)
Dept: FAMILY MEDICINE | Facility: CLINIC | Age: 52
End: 2022-02-25
Payer: OTHER GOVERNMENT

## 2022-02-25 NOTE — TELEPHONE ENCOUNTER
Pt appointment is schedule.  ----- Message from Tayo Chin Jr., MD sent at 2/25/2022  3:32 AM CST -----  Patient did labs but has no appt with me. Needs appt with me in next 2 weeks.

## 2022-03-02 ENCOUNTER — TELEPHONE (OUTPATIENT)
Dept: SLEEP MEDICINE | Facility: CLINIC | Age: 52
End: 2022-03-02
Payer: OTHER GOVERNMENT

## 2022-03-02 NOTE — TELEPHONE ENCOUNTER
Notified of difficulties encountered with study interpretation/software per Dr. Sandoval.He will be back to clinic this week. Will notify pt of results and plan either OA custom fi with requal PSG vs definitive treatment from get go which is PAP setup/use with adherence monitoring. Prefers nose mask.

## 2022-03-03 NOTE — PROCEDURES
"Dear Provider,     You have ordered sleep LAB services to perform the sleep study for Jonah Gordon.  The sleep study that you ordered is complete.      Please find Sleep Study result in "Chart Review" under the "Media tab."      As the ordering provider, you are responsible for reviewing the results and implementing a treatment plan with your patient.    If you need a Sleep Medicine provider to explain the sleep study findings and arrange treatment for the patient, please refer patient for consultation to our Sleep Clinic via [facility] with Ambulatory Consult Sleep.    To do that please place an order for an  "Ambulatory Consult Sleep" - it will go to our clinic work queue for our Medical Assistant to contact the patient for an appointment.     For any questions, please contact our clinic staff at [phone] to talk to clinical staff.   "

## 2022-03-04 DIAGNOSIS — G47.33 OSA (OBSTRUCTIVE SLEEP APNEA): Primary | ICD-10-CM

## 2022-03-10 ENCOUNTER — OFFICE VISIT (OUTPATIENT)
Dept: FAMILY MEDICINE | Facility: CLINIC | Age: 52
End: 2022-03-10
Payer: OTHER GOVERNMENT

## 2022-03-10 VITALS
BODY MASS INDEX: 40.26 KG/M2 | WEIGHT: 313.69 LBS | RESPIRATION RATE: 18 BRPM | DIASTOLIC BLOOD PRESSURE: 88 MMHG | TEMPERATURE: 98 F | OXYGEN SATURATION: 99 % | HEART RATE: 76 BPM | SYSTOLIC BLOOD PRESSURE: 138 MMHG | HEIGHT: 74 IN

## 2022-03-10 DIAGNOSIS — E78.5 DYSLIPIDEMIA: ICD-10-CM

## 2022-03-10 DIAGNOSIS — E11.9 DIABETES MELLITUS WITHOUT COMPLICATION: Primary | ICD-10-CM

## 2022-03-10 DIAGNOSIS — I10 HYPERTENSION, ESSENTIAL: ICD-10-CM

## 2022-03-10 DIAGNOSIS — E66.01 CLASS 3 SEVERE OBESITY DUE TO EXCESS CALORIES WITH SERIOUS COMORBIDITY AND BODY MASS INDEX (BMI) OF 40.0 TO 44.9 IN ADULT: ICD-10-CM

## 2022-03-10 PROCEDURE — 99214 PR OFFICE/OUTPT VISIT, EST, LEVL IV, 30-39 MIN: ICD-10-PCS | Mod: S$PBB,,, | Performed by: FAMILY MEDICINE

## 2022-03-10 PROCEDURE — 99999 PR PBB SHADOW E&M-EST. PATIENT-LVL V: CPT | Mod: PBBFAC,,, | Performed by: FAMILY MEDICINE

## 2022-03-10 PROCEDURE — 99214 OFFICE O/P EST MOD 30 MIN: CPT | Mod: S$PBB,,, | Performed by: FAMILY MEDICINE

## 2022-03-10 PROCEDURE — 99999 PR PBB SHADOW E&M-EST. PATIENT-LVL V: ICD-10-PCS | Mod: PBBFAC,,, | Performed by: FAMILY MEDICINE

## 2022-03-10 PROCEDURE — 99215 OFFICE O/P EST HI 40 MIN: CPT | Mod: PBBFAC,PN | Performed by: FAMILY MEDICINE

## 2022-03-10 RX ORDER — LANCETS
EACH MISCELLANEOUS
Qty: 100 EACH | Refills: 11 | Status: SHIPPED | OUTPATIENT
Start: 2022-03-10

## 2022-03-10 RX ORDER — INSULIN PUMP SYRINGE, 3 ML
EACH MISCELLANEOUS
Qty: 1 EACH | Refills: 0 | Status: SHIPPED | OUTPATIENT
Start: 2022-03-10 | End: 2023-03-10

## 2022-03-10 RX ORDER — METFORMIN HYDROCHLORIDE 500 MG/1
500 TABLET, EXTENDED RELEASE ORAL
Qty: 90 TABLET | Refills: 3 | Status: SHIPPED | OUTPATIENT
Start: 2022-03-10 | End: 2023-03-10

## 2022-03-10 NOTE — PROGRESS NOTES
03/10/22 1539   Depression Patient Health Questionnaire (PHQ-2)   Over the last two weeks how often have you been bothered by little interest or pleasure in doing things 0   Over the last two weeks how often have you been bothered by feeling down, depressed or hopeless 0   PHQ-2 Total Score 0

## 2022-03-10 NOTE — PATIENT INSTRUCTIONS
Build up to moderate-strenous exercise of 150 minutes a week where you heart rate is around 135 beats per minute (220-age) x 80%  (That breaks down to 30 minutes a day, 5 days a week)

## 2022-03-10 NOTE — PROGRESS NOTES
Subjective:       Patient ID: Jonah Gordon is a 52 y.o. male.    Chief Complaint: No chief complaint on file.    HPI  Review of Systems      Objective:      Physical Exam    The 10-year ASCVD risk score (Manpreetjasper JADE Jr., et al., 2013) is: 25.1%    Values used to calculate the score:      Age: 52 years      Sex: Male      Is Non- : Yes      Diabetic: Yes      Tobacco smoker: No      Systolic Blood Pressure: 158 mmHg      Is BP treated: Yes      HDL Cholesterol: 41 mg/dL      Total Cholesterol: 194 mg/dL    Assessment:       Problem List Items Addressed This Visit    None         Plan:       ***

## 2022-03-10 NOTE — TELEPHONE ENCOUNTER
No new care gaps identified.  Powered by [employer]. Reference number: [id number].   3/10/2022 4:26:36 PM CST

## 2022-03-11 DIAGNOSIS — I10 HYPERTENSION, BENIGN: ICD-10-CM

## 2022-03-11 NOTE — TELEPHONE ENCOUNTER
No new care gaps identified.  Powered by [employer]. Reference number: [id number].   3/11/2022 9:44:31 AM CST

## 2022-03-14 NOTE — PROGRESS NOTES
Subjective:       Patient ID: Jonah Gordon is a 52 y.o. male.    Chief Complaint: No chief complaint on file.    Diabetes  He presents for his follow-up diabetic visit. He has type 2 diabetes mellitus. Pertinent negatives for hypoglycemia include no sweats. Pertinent negatives for diabetes include no blurred vision, no chest pain, no fatigue, no foot paresthesias, no foot ulcerations, no polydipsia, no polyphagia, no polyuria, no visual change and no weakness. Risk factors for coronary artery disease include hypertension, dyslipidemia, diabetes mellitus, obesity and male sex. He is compliant with treatment all of the time.   Hypertension  This is a chronic problem. The problem is uncontrolled. Pertinent negatives include no blurred vision, chest pain, malaise/fatigue, neck pain, orthopnea, palpitations, PND, shortness of breath or sweats. Risk factors for coronary artery disease include diabetes mellitus, male gender, obesity and dyslipidemia.     Review of Systems   Constitutional: Negative for fatigue and malaise/fatigue.   Eyes: Negative for blurred vision.   Respiratory: Negative for shortness of breath.    Cardiovascular: Negative for chest pain, palpitations, orthopnea and PND.   Endocrine: Negative for polydipsia, polyphagia and polyuria.   Musculoskeletal: Negative for neck pain.   Neurological: Negative for weakness.         Objective:      Physical Exam  Vitals reviewed.   Constitutional:       General: He is not in acute distress.     Appearance: He is well-developed. He is not diaphoretic.   HENT:      Head: Normocephalic and atraumatic.      Right Ear: Tympanic membrane, ear canal and external ear normal.      Left Ear: Tympanic membrane, ear canal and external ear normal.      Nose: Nose normal.   Eyes:      General:         Right eye: No discharge.         Left eye: No discharge.      Conjunctiva/sclera: Conjunctivae normal.      Pupils: Pupils are equal, round, and reactive to light.   Neck:       Thyroid: No thyromegaly.      Trachea: No tracheal deviation.   Cardiovascular:      Rate and Rhythm: Normal rate and regular rhythm.      Pulses:           Radial pulses are 2+ on the right side and 2+ on the left side.      Heart sounds: Normal heart sounds, S1 normal and S2 normal. No murmur heard.  Pulmonary:      Effort: Pulmonary effort is normal. No respiratory distress.      Breath sounds: Normal breath sounds. No wheezing, rhonchi or rales.   Abdominal:      General: Bowel sounds are normal. There is no distension.      Palpations: Abdomen is soft. Abdomen is not rigid. There is no mass.      Tenderness: There is no abdominal tenderness. There is no guarding.   Musculoskeletal:      Cervical back: Normal range of motion and neck supple.   Lymphadenopathy:      Cervical: No cervical adenopathy.   Skin:     General: Skin is warm and dry.      Capillary Refill: Capillary refill takes less than 2 seconds.      Findings: No rash.   Neurological:      Mental Status: He is alert and oriented to person, place, and time.      Cranial Nerves: No cranial nerve deficit.      Sensory: No sensory deficit.      Motor: No atrophy or abnormal muscle tone.      Deep Tendon Reflexes:      Reflex Scores:       Patellar reflexes are 2+ on the right side and 2+ on the left side.  Psychiatric:         Behavior: Behavior normal.         Assessment:       1. Diabetes mellitus without complication    2. Hypertension, essential    3. Dyslipidemia    4. Class 3 severe obesity due to excess calories with serious comorbidity and body mass index (BMI) of 40.0 to 44.9 in adult        Plan:       Diagnoses and all orders for this visit:    Diabetes mellitus without complication  -     metFORMIN (GLUCOPHAGE-XR) 500 MG ER 24hr tablet; Take 1 tablet (500 mg total) by mouth daily with breakfast.  -     blood-glucose meter kit; To check BG 3 times daily, to use with insurance preferred meter  -     lancets Misc; To check BG 3 times daily, to  use with insurance preferred meter  -     blood sugar diagnostic Strp; To check BG 3 times daily, to use with insurance preferred meter  As A1c going up, will start on Gluophage  Check fasting fingersticks  Follow up in 6 weeks     Hypertension, essential  Continue current regimen    Dyslipidemia  Once BP better  Recalculate ASCVD risk score    Class 3 severe obesity due to excess calories with serious comorbidity and body mass index (BMI) of 40.0 to 44.9 in adult  The patient's BMI has been recorded in the chart. The patient has been provided educational materials regarding the benefits of attaining and maintaining a normal weight. We will continue to address and follow this issue during follow up visits.

## 2022-03-18 RX ORDER — CARVEDILOL 25 MG/1
TABLET ORAL
Qty: 180 TABLET | Refills: 3 | Status: SHIPPED | OUTPATIENT
Start: 2022-03-18

## 2022-03-18 NOTE — TELEPHONE ENCOUNTER
Refill Authorization Note   Jonah Gordon  is requesting a refill authorization.  Brief Assessment and Rationale for Refill:  Approve     Medication Therapy Plan:       Medication Reconciliation Completed: No   Comments:   --->Care Gap information included below if applicable.   Orders Placed This Encounter    carvediloL (COREG) 25 MG tablet      Requested Prescriptions   Signed Prescriptions Disp Refills    carvediloL (COREG) 25 MG tablet 180 tablet 3     Sig: TAKE 1 TABLET(25 MG) BY MOUTH TWICE DAILY WITH MEALS       Cardiovascular:  Beta Blockers Passed - 3/10/2022  4:26 PM        Passed - Patient is at least 18 years old        Passed - Last BP in normal range within 360 days     BP Readings from Last 3 Encounters:   03/10/22 138/88   02/02/22 (!) 144/93   09/22/21 136/85               Passed - Last Heart Rate in normal range within 360 days     Pulse Readings from Last 1 Encounters:   03/10/22 76              Passed - Valid encounter within last 15 months     Recent Visits  Date Type Provider Dept   03/10/22 Office Visit Tayo Chin Jr., MD [facility] Med   09/22/21 Office Visit Tayo Chin Jr., MD [facility] Med   06/30/21 Office Visit Tayo Chin Jr., MD [facility] Med   09/21/20 Office Visit Tayo Chin Jr., MD [facility] Med   07/30/20 Office Visit Tayo Chin Jr., MD [facility] Med   Showing recent visits within past 720 days and meeting all other requirements  Future Appointments  No visits were found meeting these conditions.  Showing future appointments within next 150 days and meeting all other requirements      Future Appointments              In 1 month Tayo Chin Jr., MD [facility]                    Appointments  past 12m or future 3m with PCP    Date Provider   Last Visit   3/10/2022 Tayo Chin Jr., MD   Next Visit   3/11/2022 Tayo Chin  MD Kevin   ED visits in past 90 days: 0     Note composed:12:41 PM 03/18/2022

## 2022-03-21 RX ORDER — AMLODIPINE BESYLATE 10 MG/1
TABLET ORAL
Qty: 90 TABLET | Refills: 3 | Status: SHIPPED | OUTPATIENT
Start: 2022-03-21

## 2022-03-22 RX ORDER — CHLORTHALIDONE 50 MG/1
TABLET ORAL
Qty: 90 TABLET | Refills: 1 | Status: SHIPPED | OUTPATIENT
Start: 2022-03-22

## 2022-03-22 NOTE — TELEPHONE ENCOUNTER
Refill Routing Note   Medication(s) are not appropriate for processing by [facility] for the following reason(s):      - Required laboratory values are abnormal  - Drug-Disease Interaction (chlorthalidone and CKD (chronic kidney disease) stage 2, GFR 60-89 ml/min)    [facility] action(s):  Defer  Approve          --->Care Gap information included in message below if applicable.   Medication reconciliation completed: No   Automatic Epic Generated Protocol Data:    Orders Placed This Encounter    amLODIPine (NORVASC) 10 MG tablet      Requested Prescriptions   Pending Prescriptions Disp Refills    chlorthalidone (HYGROTEN) 50 MG Tab [Pharmacy Med Name: CHLORTHALIDONE 50MG TABLETS] 90 tablet 3     Sig: TAKE 1 TABLET(50 MG) BY MOUTH EVERY DAY       Cardiovascular: Diuretics - Thiazide Failed - 3/21/2022  9:08 PM        Failed - Cr is 1.39 or below and within 360 days     Lab Results   Component Value Date    CREATININE 1.4 02/11/2022    CREATININE 1.2 08/16/2021    CREATININE 1.2 08/15/2021              Passed - Patient is at least 18 years old        Passed - Last BP in normal range within 360 days     BP Readings from Last 3 Encounters:   03/10/22 138/88   02/02/22 (!) 144/93   09/22/21 136/85               Passed - Valid encounter within last 15 months     Recent Visits  Date Type Provider Dept   03/10/22 Office Visit Tayo Chin Jr., MD [facility]/ [facility] Med   09/22/21 Office Visit Tayo Chin Jr., MD [facility] Med   06/30/21 Office Visit Tayo Chin Jr., MD [facility] Med   09/21/20 Office Visit Tayo Chin Jr., MD [facility] Med   07/30/20 Office Visit Tayo Chin Jr., MD [facility]/ Internal Med   Showing recent visits within past 720 days and meeting all other requirements  Future Appointments  No visits were found meeting these conditions.  Showing future appointments within next 150 days and meeting all  other requirements      Future Appointments              In 1 month MD Mario Garland Jr. [facility]                Passed - K in normal range and within 360 days     Potassium   Date Value Ref Range Status   02/11/2022 3.5 3.5 - 5.1 mmol/L Final   08/16/2021 3.6 3.5 - 5.1 mmol/L Final   08/15/2021 3.9 3.5 - 5.1 mmol/L Final              Passed - Na is between 130 and 148 and within 360 days     Sodium   Date Value Ref Range Status   02/11/2022 141 136 - 145 mmol/L Final   08/16/2021 140 136 - 145 mmol/L Final   08/15/2021 140 136 - 145 mmol/L Final              Passed - eGFR within 360 days     Lab Results   Component Value Date    EGFRNONAA 58 (A) 02/11/2022    EGFRNONAA >60.0 08/16/2021    EGFRNONAA >60.0 08/15/2021                 Signed Prescriptions Disp Refills    amLODIPine (NORVASC) 10 MG tablet 90 tablet 3     Sig: TAKE 1 TABLET(10 MG) BY MOUTH EVERY DAY       Cardiovascular:  Calcium Channel Blockers Passed - 3/11/2022  9:44 AM        Passed - Patient is at least 18 years old        Passed - Last BP in normal range within 360 days     BP Readings from Last 3 Encounters:   03/10/22 138/88   02/02/22 (!) 144/93   09/22/21 136/85               Passed - Valid encounter within last 15 months     Recent Visits  Date Type Provider Dept   03/10/22 Office Visit Tayo Chin Jr., MD [facility]/ Internal Med   09/22/21 Office Visit Tayo Chin Jr., MD [facility] Med   06/30/21 Office Visit Tayo Chin Jr., MD [facility] Med   09/21/20 Office Visit Tayo Chin Jr., MD [facility]/ Internal Med   07/30/20 Office Visit Tayo Chin Jr., MD [facility]/ Internal Med   Showing recent visits within past 720 days and meeting all other requirements  Future Appointments  No visits were found meeting these conditions.  Showing future appointments within next 150 days and meeting all other requirements      Future  Appointments              In 1 month Tayo Chin Jr., MD [facility]                      Appointments  past 12m or future 3m with PCP    Date Provider   Last Visit   3/10/2022 Tayo Chin Jr., MD   Next Visit   4/21/2022 Tayo Chin Jr., MD   ED visits in past 90 days: 0        Note composed:9:09 PM 03/21/2022

## 2022-04-08 ENCOUNTER — OFFICE VISIT (OUTPATIENT)
Dept: URGENT CARE | Facility: CLINIC | Age: 52
End: 2022-04-08
Payer: OTHER GOVERNMENT

## 2022-04-08 VITALS
TEMPERATURE: 99 F | RESPIRATION RATE: 18 BRPM | OXYGEN SATURATION: 98 % | BODY MASS INDEX: 40.17 KG/M2 | HEART RATE: 74 BPM | WEIGHT: 313 LBS | HEIGHT: 74 IN | DIASTOLIC BLOOD PRESSURE: 95 MMHG | SYSTOLIC BLOOD PRESSURE: 156 MMHG

## 2022-04-08 DIAGNOSIS — H65.193 ACUTE MEE (MIDDLE EAR EFFUSION), BILATERAL: ICD-10-CM

## 2022-04-08 DIAGNOSIS — J02.9 SORE THROAT: ICD-10-CM

## 2022-04-08 DIAGNOSIS — H66.002 ACUTE SUPPURATIVE OTITIS MEDIA OF LEFT EAR WITHOUT SPONTANEOUS RUPTURE OF TYMPANIC MEMBRANE, RECURRENCE NOT SPECIFIED: Primary | ICD-10-CM

## 2022-04-08 LAB
CTP QC/QA: YES
MOLECULAR STREP A: NEGATIVE

## 2022-04-08 PROCEDURE — 87651 STREP A DNA AMP PROBE: CPT | Mod: QW,S$GLB,, | Performed by: NURSE PRACTITIONER

## 2022-04-08 PROCEDURE — 99214 PR OFFICE/OUTPT VISIT, EST, LEVL IV, 30-39 MIN: ICD-10-PCS | Mod: S$GLB,,, | Performed by: NURSE PRACTITIONER

## 2022-04-08 PROCEDURE — 87651 POCT STREP A MOLECULAR: ICD-10-PCS | Mod: QW,S$GLB,, | Performed by: NURSE PRACTITIONER

## 2022-04-08 PROCEDURE — 99214 OFFICE O/P EST MOD 30 MIN: CPT | Mod: S$GLB,,, | Performed by: NURSE PRACTITIONER

## 2022-04-08 RX ORDER — FLUTICASONE PROPIONATE 50 MCG
1 SPRAY, SUSPENSION (ML) NASAL DAILY
Qty: 15.8 ML | Refills: 1 | Status: SHIPPED | OUTPATIENT
Start: 2022-04-08 | End: 2023-11-15

## 2022-04-08 RX ORDER — AMOXICILLIN AND CLAVULANATE POTASSIUM 875; 125 MG/1; MG/1
1 TABLET, FILM COATED ORAL 2 TIMES DAILY
Qty: 14 TABLET | Refills: 0 | Status: SHIPPED | OUTPATIENT
Start: 2022-04-08 | End: 2022-04-15

## 2022-04-08 RX ORDER — AZELASTINE 1 MG/ML
1 SPRAY, METERED NASAL 2 TIMES DAILY
Qty: 30 ML | Refills: 1 | Status: SHIPPED | OUTPATIENT
Start: 2022-04-08 | End: 2023-11-15

## 2022-04-08 NOTE — PROGRESS NOTES
"Subjective:       Patient ID: Jonah Gordon is a 52 y.o. male.    Vitals:  height is 6' 2" (1.88 m) and weight is 142 kg (313 lb) (abnormal). His temperature is 98.6 °F (37 °C). His blood pressure is 156/95 (abnormal) and his pulse is 74. His respiration is 18 and oxygen saturation is 98%.     Chief Complaint: Sore Throat and Otalgia (Both/)    Sore Throat   This is a new problem. The current episode started in the past 7 days. The problem has been unchanged. Neither side of throat is experiencing more pain than the other. There has been no fever. The pain is at a severity of 8/10. The pain is severe. Associated symptoms include congestion, ear pain, swollen glands and trouble swallowing. Pertinent negatives include no diarrhea, drooling or ear discharge. He has had no exposure to strep or mono. He has tried nothing for the symptoms.       Constitution: Negative for chills, sweating, fatigue and fever.   HENT: Positive for ear pain, congestion, sore throat and trouble swallowing. Negative for ear discharge and drooling.    Gastrointestinal: Negative for diarrhea.   Allergic/Immunologic: Positive for environmental allergies.       Objective:      Physical Exam   Constitutional:  Non-toxic appearance. He does not appear ill. No distress.   HENT:   Ears:   Right Ear: Tympanic membrane is injected, erythematous and bulging. A middle ear effusion is present.   Left Ear: Tympanic membrane is not injected, not erythematous and not bulging. A middle ear effusion is present.   Nose: Mucosal edema and rhinorrhea present. Right sinus exhibits no frontal sinus tenderness.   Mouth/Throat: Uvula is midline and mucous membranes are normal. No uvula swelling. Posterior oropharyngeal erythema (mild) present. No oropharyngeal exudate or posterior oropharyngeal edema. No tonsillar exudate.   Lymphadenopathy:     He has cervical adenopathy.   Neurological: He is alert.   Skin: Skin is not diaphoretic.   Nursing note and vitals " reviewed.        Results for orders placed or performed in visit on 04/08/22   POCT Strep A, Molecular   Result Value Ref Range    Molecular Strep A, POC Negative Negative     Acceptable Yes        Assessment:       1. Acute suppurative otitis media of left ear without spontaneous rupture of tympanic membrane, recurrence not specified    2. Sore throat    3. Acute SUSANA (middle ear effusion), bilateral          Plan:         Acute suppurative otitis media of left ear without spontaneous rupture of tympanic membrane, recurrence not specified  -     fluticasone propionate (FLONASE) 50 mcg/actuation nasal spray; 1 spray (50 mcg total) by Each Nostril route once daily.  Dispense: 15.8 mL; Refill: 1  -     azelastine (ASTELIN) 137 mcg (0.1 %) nasal spray; 1 spray (137 mcg total) by Nasal route 2 (two) times daily.  Dispense: 30 mL; Refill: 1  -     amoxicillin-clavulanate 875-125mg (AUGMENTIN) 875-125 mg per tablet; Take 1 tablet by mouth 2 (two) times daily. With food for 7 days  Dispense: 14 tablet; Refill: 0    Sore throat  -     POCT Strep A, Molecular    Acute SUSANA (middle ear effusion), bilateral  -     fluticasone propionate (FLONASE) 50 mcg/actuation nasal spray; 1 spray (50 mcg total) by Each Nostril route once daily.  Dispense: 15.8 mL; Refill: 1  -     azelastine (ASTELIN) 137 mcg (0.1 %) nasal spray; 1 spray (137 mcg total) by Nasal route 2 (two) times daily.  Dispense: 30 mL; Refill: 1  -     amoxicillin-clavulanate 875-125mg (AUGMENTIN) 875-125 mg per tablet; Take 1 tablet by mouth 2 (two) times daily. With food for 7 days  Dispense: 14 tablet; Refill: 0

## 2022-04-08 NOTE — PATIENT INSTRUCTIONS
Return to Urgent Care or go to ER if symptoms worsen or fail to improve.  Follow up with PCP as recommended for further management.     THE FOLLOWING ARE RECOMMENDED TO HELP YOU MANAGE YOUR SYMPTOMS:    Use Nasal Saline Ocean Spray to relieve sinus congestion and pain. It is recommended that you use the nasal saline prior to using any topical nasal sprays to help clear the mucus so the medication is able to get to the mucus membranes.      Take allegra or zyrtec daily according to label instructions for allergy symptoms.    Use Flonase or similar Over the Counter steroid nasal spray -- 1-2 sprays/nostril per day-- you may initially use it 2xs/day x 5 days to help with symptoms; then resume 1-2 sprays/nostril per day. Spray towards the outer side of the nose; do not spray straight back and do not spray to the center of the nose, as it will not work as effectively and may cause your nose to bleed.     Take Ibuprofen or Acetaminophen according to label instructions for fever, throat pain, headache, and body aches    Use warm salt water gargles to ease your throat pain. Warm salt water gargles as needed for sore throat-  1/2 tsp salt to 1 cup warm water, gargle as desired.

## 2022-04-19 ENCOUNTER — TELEPHONE (OUTPATIENT)
Dept: FAMILY MEDICINE | Facility: CLINIC | Age: 52
End: 2022-04-19
Payer: OTHER GOVERNMENT

## 2022-04-19 NOTE — TELEPHONE ENCOUNTER
Called patient and confirmed [facility] .    Faxed KALEB request to [facility] twice .

## 2022-04-19 NOTE — LETTER
AUTHORIZATION FOR RELEASE OF   CONFIDENTIAL INFORMATION    Dear [facility],    We are seeing Jonah Gordon, date of birth 1970, in the clinic at [facility]. Tayo Chin Jr, MD is the patient's PCP. Jonah Gordon has an outstanding lab/procedure at the time we reviewed his chart. In order to help keep his health information updated, he has authorized us to request the following medical record(s):        (  )  MAMMOGRAM                                      (  )  COLONOSCOPY      (  )  PAP SMEAR                                          (  )  OUTSIDE LAB RESULTS     (  )  DEXA SCAN                                          ( X ) DIABETIC EYE EXAM            (  )  FOOT EXAM                                          (  )  ENTIRE RECORD     (  )  OUTSIDE IMMUNIZATIONS                 (  )  _______________         Please fax records to [facility], Cesar R Roque Jr, MD, FAX [phone]   [address] [facility] [place] [facility] [address]       If you have any questions, please contact Christina Apodaca MA,[facility] at [phone].              Patient Name: Jonah Gordon  : 1970  Patient Phone #: [phone]

## 2022-04-20 ENCOUNTER — TELEPHONE (OUTPATIENT)
Dept: FAMILY MEDICINE | Facility: CLINIC | Age: 52
End: 2022-04-20
Payer: OTHER GOVERNMENT

## 2022-04-20 ENCOUNTER — PATIENT OUTREACH (OUTPATIENT)
Dept: ADMINISTRATIVE | Facility: HOSPITAL | Age: 52
End: 2022-04-20
Payer: OTHER GOVERNMENT

## 2022-04-27 ENCOUNTER — OFFICE VISIT (OUTPATIENT)
Dept: URGENT CARE | Facility: CLINIC | Age: 52
End: 2022-04-27
Payer: OTHER GOVERNMENT

## 2022-04-27 VITALS
TEMPERATURE: 99 F | OXYGEN SATURATION: 98 % | DIASTOLIC BLOOD PRESSURE: 94 MMHG | BODY MASS INDEX: 40.17 KG/M2 | RESPIRATION RATE: 18 BRPM | SYSTOLIC BLOOD PRESSURE: 165 MMHG | HEART RATE: 75 BPM | HEIGHT: 74 IN | WEIGHT: 313 LBS

## 2022-04-27 DIAGNOSIS — H92.01 RIGHT EAR PAIN: ICD-10-CM

## 2022-04-27 DIAGNOSIS — J30.9 ALLERGIC RHINITIS, UNSPECIFIED SEASONALITY, UNSPECIFIED TRIGGER: ICD-10-CM

## 2022-04-27 DIAGNOSIS — H60.542 ACUTE ECZEMATOID OTITIS EXTERNA OF LEFT EAR: ICD-10-CM

## 2022-04-27 DIAGNOSIS — H65.193 ACUTE MEE (MIDDLE EAR EFFUSION), BILATERAL: Primary | ICD-10-CM

## 2022-04-27 PROCEDURE — 99213 PR OFFICE/OUTPT VISIT, EST, LEVL III, 20-29 MIN: ICD-10-PCS | Mod: S$GLB,,, | Performed by: NURSE PRACTITIONER

## 2022-04-27 PROCEDURE — 99213 OFFICE O/P EST LOW 20 MIN: CPT | Mod: S$GLB,,, | Performed by: NURSE PRACTITIONER

## 2022-04-27 RX ORDER — NEOMYCIN SULFATE, POLYMYXIN B SULFATE AND HYDROCORTISONE 10; 3.5; 1 MG/ML; MG/ML; [USP'U]/ML
3 SUSPENSION/ DROPS AURICULAR (OTIC) 3 TIMES DAILY
Qty: 6 ML | Refills: 0 | Status: SHIPPED | OUTPATIENT
Start: 2022-04-27 | End: 2022-05-07

## 2022-04-27 NOTE — PATIENT INSTRUCTIONS
Return to Urgent Care or go to ER if symptoms worsen or fail to improve.  Follow up with PCP as recommended for further management.     You have been referred to ENT for further evaluation and management.  Scheduling should contact you to make an appointment.  If you do not hear from anyone or if you would like to make an appointment ASA, please call [phone] to schedule an appointment.    Continue azelastine and flonase nasal sprays as prescribed.  Take allegra over the counter according to label instructions daily for allergy symptoms.    THE FOLLOWING ARE RECOMMENDED TO HELP YOU MANAGE YOUR SYMPTOMS:    Use Afrin in each nare for no longer than 3-5 days, as it is addictive. It can also dry out your mucous membranes and cause elevated blood pressure.    Take Acetaminophen according to label instructions for fever, throat pain, headache, and body aches    Use warm salt water gargles to ease your throat pain. Warm salt water gargles as needed for sore throat-  1/2 tsp salt to 1 cup warm water, gargle as desired.

## 2022-04-27 NOTE — PROGRESS NOTES
"Subjective:       Patient ID: Jonah Gordon is a 52 y.o. male.    Vitals:  height is 6' 2" (1.88 m) and weight is 142 kg (313 lb) (abnormal). His temperature is 98.6 °F (37 °C). His blood pressure is 165/94 (abnormal) and his pulse is 75. His respiration is 18 and oxygen saturation is 98%.     Chief Complaint: Otalgia (Right ear)    Treated for left AOM about 20 days ago-- completed antibiotic.  Still taking flonase and azelastine as instructed.     Otalgia   There is pain in the right ear. This is a new problem. The current episode started yesterday. The problem occurs constantly. The problem has been unchanged. There has been no fever. The pain is at a severity of 6/10. The pain is moderate. Associated symptoms include a sore throat. Pertinent negatives include no coughing, headaches, hearing loss or rhinorrhea. He has tried nothing for the symptoms. The treatment provided no relief. There is no history of a chronic ear infection or a tympanostomy tube.       HENT: Positive for ear pain and sore throat. Negative for hearing loss.    Respiratory: Negative for cough.    Neurological: Negative for headaches.       Objective:      Physical Exam   Constitutional:  Non-toxic appearance. He does not appear ill. No distress.   HENT:   Ears:   Right Ear: There is swelling (and erythema to ear canal). Tympanic membrane is not erythematous. A middle ear effusion is present.   Left Ear: There is swelling (with erythema to ear canal L>R) and tenderness. Tympanic membrane is not erythematous. A middle ear effusion is present.   Nose: Mucosal edema and rhinorrhea present. No purulent discharge. Right sinus exhibits no maxillary sinus tenderness and no frontal sinus tenderness. Left sinus exhibits no maxillary sinus tenderness and no frontal sinus tenderness.   Mouth/Throat: Uvula is midline and mucous membranes are normal. No uvula swelling. Posterior oropharyngeal erythema (mild) present. No oropharyngeal exudate or posterior " oropharyngeal edema. No tonsillar exudate.   Pulmonary/Chest: Effort normal. No respiratory distress.   Neurological: He is alert.   Skin: Skin is not diaphoretic.   Nursing note and vitals reviewed.        Assessment:       1. Acute SUSANA (middle ear effusion), bilateral    2. Allergic rhinitis, unspecified seasonality, unspecified trigger    3. Right ear pain    4. Acute eczematoid otitis externa of left ear          Plan:         Acute SUSANA (middle ear effusion), bilateral  -     Ambulatory referral/consult to ENT  -     neomycin-polymyxin-hydrocortisone (CORTISPORIN) 3.5-10,000-1 mg/mL-unit/mL-% otic suspension; Place 3 drops into both ears 3 (three) times daily. for 10 days  Dispense: 6 mL; Refill: 0    Allergic rhinitis, unspecified seasonality, unspecified trigger  -     Ambulatory referral/consult to ENT    Right ear pain  -     Ambulatory referral/consult to ENT    Acute eczematoid otitis externa of left ear  -     neomycin-polymyxin-hydrocortisone (CORTISPORIN) 3.5-10,000-1 mg/mL-unit/mL-% otic suspension; Place 3 drops into both ears 3 (three) times daily. for 10 days  Dispense: 6 mL; Refill: 0

## 2022-08-24 ENCOUNTER — PATIENT MESSAGE (OUTPATIENT)
Dept: ADMINISTRATIVE | Facility: HOSPITAL | Age: 52
End: 2022-08-24
Payer: OTHER GOVERNMENT

## 2022-10-10 ENCOUNTER — PATIENT MESSAGE (OUTPATIENT)
Dept: ADMINISTRATIVE | Facility: HOSPITAL | Age: 52
End: 2022-10-10
Payer: OTHER GOVERNMENT

## 2022-11-09 ENCOUNTER — PATIENT MESSAGE (OUTPATIENT)
Dept: ADMINISTRATIVE | Facility: HOSPITAL | Age: 52
End: 2022-11-09
Payer: OTHER GOVERNMENT

## 2022-11-10 ENCOUNTER — PATIENT MESSAGE (OUTPATIENT)
Dept: ADMINISTRATIVE | Facility: HOSPITAL | Age: 52
End: 2022-11-10
Payer: OTHER GOVERNMENT

## 2022-12-21 DIAGNOSIS — Z12.11 COLON CANCER SCREENING: ICD-10-CM

## 2023-01-18 ENCOUNTER — PATIENT MESSAGE (OUTPATIENT)
Dept: ADMINISTRATIVE | Facility: HOSPITAL | Age: 53
End: 2023-01-18
Payer: OTHER GOVERNMENT

## 2023-02-08 ENCOUNTER — PATIENT MESSAGE (OUTPATIENT)
Dept: ADMINISTRATIVE | Facility: HOSPITAL | Age: 53
End: 2023-02-08
Payer: OTHER GOVERNMENT

## 2023-03-09 ENCOUNTER — PATIENT OUTREACH (OUTPATIENT)
Dept: ADMINISTRATIVE | Facility: HOSPITAL | Age: 53
End: 2023-03-09
Payer: OTHER GOVERNMENT

## 2023-03-09 DIAGNOSIS — R73.03 PREDIABETES: Primary | ICD-10-CM

## 2023-03-09 NOTE — PROGRESS NOTES
Pt requested that a Fit kit be mailed out. OV scheduled with Dr. Chin. Would like to complete labs at OV. [facility] and [facility] for an eye exam.

## 2023-03-10 ENCOUNTER — PATIENT OUTREACH (OUTPATIENT)
Dept: ADMINISTRATIVE | Facility: HOSPITAL | Age: 53
End: 2023-03-10
Payer: OTHER GOVERNMENT

## 2023-03-10 NOTE — PROGRESS NOTES
FitKit was given to patient on 3/10/2023 7:26 AM. Fit kit mailed out to patient. Immunization's updated.

## 2023-03-23 ENCOUNTER — PATIENT OUTREACH (OUTPATIENT)
Dept: ADMINISTRATIVE | Facility: HOSPITAL | Age: 53
End: 2023-03-23
Payer: OTHER GOVERNMENT

## 2023-03-23 NOTE — LETTER
AUTHORIZATION FOR RELEASE OF   CONFIDENTIAL INFORMATION    Dear [facility],    We are seeing Jonah Gordon, date of birth 1970, in the clinic at [facility]. Tayo Chin Jr, MD is the patient's PCP. Jonah Gordon has an outstanding lab/procedure at the time we reviewed his chart. In order to help keep his health information updated, he has authorized us to request the following medical record(s):        (  )  MAMMOGRAM                                      (  )  COLONOSCOPY      (  )  PAP SMEAR                                          (  )  OUTSIDE LAB RESULTS     (  )  DEXA SCAN                                          ( X ) DIABETIC EYE EXAM            (  )  FOOT EXAM                                          (  )  ENTIRE RECORD     (  )  OUTSIDE IMMUNIZATIONS                 (  )  _______________         Please fax records to [facility], Cesar R Roque Jr, MD, FAX [phone]   [address] [facility] [address]       If you have any questions, please contact Christina Apodaca MA,[facility] at [phone].              Patient Name: Jonah Gordon  : 1970  Patient Phone #: [phone]

## 2023-03-23 NOTE — PROGRESS NOTES
Pt will complete labs at upcoming office visit. KALEB sent requesting eye exam. Immunization's updated.

## 2023-04-12 ENCOUNTER — PATIENT MESSAGE (OUTPATIENT)
Dept: ADMINISTRATIVE | Facility: HOSPITAL | Age: 53
End: 2023-04-12
Payer: OTHER GOVERNMENT

## 2023-06-21 DIAGNOSIS — I10 ESSENTIAL HYPERTENSION: ICD-10-CM

## 2023-06-21 DIAGNOSIS — E11.9 TYPE 2 DIABETES MELLITUS WITHOUT COMPLICATION, UNSPECIFIED WHETHER LONG TERM INSULIN USE: ICD-10-CM

## 2023-11-15 ENCOUNTER — OFFICE VISIT (OUTPATIENT)
Dept: URGENT CARE | Facility: CLINIC | Age: 53
End: 2023-11-15
Payer: OTHER GOVERNMENT

## 2023-11-15 VITALS
HEART RATE: 83 BPM | OXYGEN SATURATION: 96 % | RESPIRATION RATE: 19 BRPM | TEMPERATURE: 98 F | HEIGHT: 74 IN | BODY MASS INDEX: 40.19 KG/M2 | SYSTOLIC BLOOD PRESSURE: 176 MMHG | DIASTOLIC BLOOD PRESSURE: 112 MMHG

## 2023-11-15 DIAGNOSIS — I16.0 ASYMPTOMATIC HYPERTENSIVE URGENCY: ICD-10-CM

## 2023-11-15 DIAGNOSIS — J06.9 VIRAL URI WITH COUGH: Primary | ICD-10-CM

## 2023-11-15 DIAGNOSIS — H65.192 ACUTE EFFUSION OF LEFT EAR: ICD-10-CM

## 2023-11-15 DIAGNOSIS — H61.21 IMPACTED CERUMEN OF RIGHT EAR: ICD-10-CM

## 2023-11-15 DIAGNOSIS — F43.21 GRIEF: ICD-10-CM

## 2023-11-15 PROBLEM — E11.29 MICROALBUMINURIA DUE TO TYPE 2 DIABETES MELLITUS: Status: ACTIVE | Noted: 2023-11-15

## 2023-11-15 PROBLEM — N52.9 ERECTILE DYSFUNCTION: Status: ACTIVE | Noted: 2023-11-15

## 2023-11-15 PROBLEM — R80.9 MICROALBUMINURIA DUE TO TYPE 2 DIABETES MELLITUS: Status: ACTIVE | Noted: 2023-11-15

## 2023-11-15 PROBLEM — M54.30 SCIATICA, UNSPECIFIED SIDE: Status: ACTIVE | Noted: 2021-03-02

## 2023-11-15 PROBLEM — N40.0 BENIGN PROSTATIC HYPERPLASIA: Status: ACTIVE | Noted: 2023-11-15

## 2023-11-15 PROBLEM — E11.9 TYPE 2 DIABETES MELLITUS WITHOUT COMPLICATIONS: Status: ACTIVE | Noted: 2023-11-15

## 2023-11-15 LAB
CTP QC/QA: YES
CTP QC/QA: YES
POC MOLECULAR INFLUENZA A AGN: NEGATIVE
POC MOLECULAR INFLUENZA B AGN: NEGATIVE
SARS-COV-2 AG RESP QL IA.RAPID: NEGATIVE

## 2023-11-15 PROCEDURE — 99213 PR OFFICE/OUTPT VISIT, EST, LEVL III, 20-29 MIN: ICD-10-PCS | Mod: S$GLB,,, | Performed by: PHYSICIAN ASSISTANT

## 2023-11-15 PROCEDURE — 99213 OFFICE O/P EST LOW 20 MIN: CPT | Mod: S$GLB,,, | Performed by: PHYSICIAN ASSISTANT

## 2023-11-15 PROCEDURE — 87811 SARS CORONAVIRUS 2 ANTIGEN POCT, MANUAL READ: ICD-10-PCS | Mod: QW,S$GLB,, | Performed by: PHYSICIAN ASSISTANT

## 2023-11-15 PROCEDURE — 87811 SARS-COV-2 COVID19 W/OPTIC: CPT | Mod: QW,S$GLB,, | Performed by: PHYSICIAN ASSISTANT

## 2023-11-15 PROCEDURE — 87502 INFLUENZA DNA AMP PROBE: CPT | Mod: QW,S$GLB,, | Performed by: PHYSICIAN ASSISTANT

## 2023-11-15 PROCEDURE — 87502 POCT INFLUENZA A/B MOLECULAR: ICD-10-PCS | Mod: QW,S$GLB,, | Performed by: PHYSICIAN ASSISTANT

## 2023-11-15 RX ORDER — CLONIDINE HYDROCHLORIDE 0.1 MG/1
0.1 TABLET ORAL
Status: COMPLETED | OUTPATIENT
Start: 2023-11-15 | End: 2023-11-15

## 2023-11-15 RX ADMIN — CLONIDINE HYDROCHLORIDE 0.1 MG: 0.1 TABLET ORAL at 11:11

## 2023-11-15 NOTE — PROGRESS NOTES
"Subjective:      Patient ID: Jonah Gordon is a 53 y.o. male.    Vitals:  height is 6' 2" (1.88 m). His oral temperature is 98.4 °F (36.9 °C). His blood pressure is 190/110 (abnormal) and his pulse is 83. His respiration is 19 and oxygen saturation is 96%.     Chief Complaint: No chief complaint on file.    Pt is here for congestion body aches, facial pressure, cough and post nasal drip. Pt says his symp started yesterday. Pt mentioned he has been taking mucinex.  Patient has a history of high blood pressure has not follow up with his primary care provider in over a year.  Has ran out of his blood pressure medication does not wish for a refill today.  Patient declines EKG.    Sinus Problem  This is a new problem. The current episode started yesterday. The problem is unchanged. There has been no fever. The fever has been present for Less than 1 day. His pain is at a severity of 6/10. The pain is moderate. Associated symptoms include congestion, coughing, headaches, sinus pressure and a sore throat. Pertinent negatives include no chills, ear pain, neck pain or shortness of breath. The treatment provided no relief.     Constitution: Negative for chills, fatigue and fever.   HENT:  Positive for congestion, postnasal drip, sinus pressure and sore throat. Negative for ear pain, ear discharge, drooling, mouth sores, tongue pain, tongue lesion, sinus pain, trouble swallowing and voice change.    Neck: Negative for neck pain, neck stiffness and painful lymph nodes.   Cardiovascular:  Negative for chest pain and sob on exertion.   Eyes:  Negative for blurred vision.   Respiratory:  Positive for cough. Negative for chest tightness, sputum production and shortness of breath.    Gastrointestinal:  Negative for abdominal pain, nausea, vomiting, constipation and diarrhea.   Musculoskeletal:  Negative for muscle cramps and muscle ache.   Skin:  Negative for rash.   Neurological:  Positive for headaches. Negative for dizziness and " altered mental status.   Hematologic/Lymphatic: Negative for swollen lymph nodes.   Psychiatric/Behavioral:  Negative for altered mental status.       Past Medical History:   Diagnosis Date    Hypertension        Past Surgical History:   Procedure Laterality Date    WISDOM TOOTH EXTRACTION         Family History   Problem Relation Age of Onset    Hypertension Father     Diabetes Father        Social History     Socioeconomic History    Marital status:    Tobacco Use    Smoking status: Never    Smokeless tobacco: Never   Substance and Sexual Activity    Alcohol use: Not Currently    Drug use: Never    Sexual activity: Yes     Partners: Female       Current Outpatient Medications   Medication Sig Dispense Refill    amLODIPine (NORVASC) 10 MG tablet TAKE 1 TABLET(10 MG) BY MOUTH EVERY DAY 90 tablet 3    blood sugar diagnostic Strp To check BG 3 times daily, to use with insurance preferred meter 100 each 11    blood-glucose meter kit To check BG 3 times daily, to use with insurance preferred meter 1 each 0    carvediloL (COREG) 25 MG tablet TAKE 1 TABLET(25 MG) BY MOUTH TWICE DAILY WITH MEALS 180 tablet 3    chlorthalidone (HYGROTEN) 50 MG Tab TAKE 1 TABLET(50 MG) BY MOUTH EVERY DAY 90 tablet 1    lancets Misc To check BG 3 times daily, to use with insurance preferred meter 100 each 11    metFORMIN (GLUCOPHAGE-XR) 500 MG ER 24hr tablet Take 1 tablet (500 mg total) by mouth daily with breakfast. 90 tablet 3    ondansetron (ZOFRAN-ODT) 8 MG TbDL Dissolve 1 tablet (8 mg total) by mouth every 8 (eight) hours as needed (Nausea). 12 tablet 0     Current Facility-Administered Medications   Medication Dose Route Frequency Provider Last Rate Last Admin    cloNIDine tablet 0.1 mg  0.1 mg Oral 1 time in Clinic/HOD Radha Bolaños PA-C           Review of patient's allergies indicates:   Allergen Reactions    Ace inhibitors Edema     Angioedema    Dye     Lisinopril        Objective:     Physical Exam   Constitutional:  He is oriented to person, place, and time. He appears well-developed. He is cooperative.  Non-toxic appearance. He does not appear ill. No distress.   HENT:   Head: Normocephalic and atraumatic.   Ears:   Right Ear: Hearing, external ear and ear canal normal. Tympanic membrane is not injected, not erythematous, not retracted and not bulging. A middle ear effusion is present. impacted cerumen  Left Ear: Hearing, external ear and ear canal normal. Tympanic membrane is not injected, not erythematous, not retracted and not bulging. A middle ear effusion is present. impacted cerumen  Nose: Rhinorrhea present. No mucosal edema, nasal deformity or congestion. No epistaxis. Right sinus exhibits no maxillary sinus tenderness and no frontal sinus tenderness. Left sinus exhibits no maxillary sinus tenderness and no frontal sinus tenderness.   Mouth/Throat: Uvula is midline and mucous membranes are normal. Mucous membranes are moist. No trismus in the jaw. Normal dentition. No uvula swelling. Posterior oropharyngeal erythema present. No oropharyngeal exudate or posterior oropharyngeal edema.   Eyes: Conjunctivae and lids are normal. Right eye exhibits no discharge. Left eye exhibits no discharge. No scleral icterus.   Neck: Trachea normal and phonation normal. Neck supple. No edema present. No erythema present. No neck rigidity present.   Cardiovascular: Normal rate, regular rhythm, normal heart sounds and normal pulses.   No murmur heard.Exam reveals no gallop and no friction rub.   Pulmonary/Chest: Effort normal and breath sounds normal. No stridor. No respiratory distress. He has no decreased breath sounds. He has no wheezes. He has no rhonchi. He has no rales.   Abdominal: Normal appearance.   Lymphadenopathy:     He has no cervical adenopathy.   Neurological: He is alert and oriented to person, place, and time. He exhibits normal muscle tone. Coordination normal.   Skin: Skin is warm, dry, intact, not diaphoretic, not pale  and no rash.   Psychiatric: He experiences Normal attention and Normal perception. His speech is normal and behavior is normal. Mood, memory, judgment and thought content normal. Cognition normal  Nursing note and vitals reviewed.    Results for orders placed or performed in visit on 11/15/23   SARS Coronavirus 2 Antigen, POCT Manual Read   Result Value Ref Range    SARS Coronavirus 2 Antigen Negative Negative     Acceptable Yes    POCT Influenza A/B MOLECULAR   Result Value Ref Range    POC Molecular Influenza A Ag Negative Negative, Not Reported    POC Molecular Influenza B Ag Negative Negative, Not Reported     Acceptable Yes          Assessment:     1. Viral URI with cough    2. Asymptomatic hypertensive urgency    3. Acute effusion of left ear    4. Impacted cerumen of right ear    5. Grief        Plan:       Viral URI with cough  -     SARS Coronavirus 2 Antigen, POCT Manual Read  -     POCT Influenza A/B MOLECULAR    Asymptomatic hypertensive urgency  -     cloNIDine tablet 0.1 mg    Acute effusion of left ear    Impacted cerumen of right ear    Grief    Results reviewed  I have reviewed the patient chart and pertinent past imaging/labs.  Patient declined EKG, refills on medications.  He will reach out to his primary care to set up an appointment however if he has been discharged as a patient will notify me and I will place a primary care referral.  Patient advised to retest for COVID in 24-48 hours    Patient is dealing with grief from recent loss of his wife and [facility].  Patient is also going to reach out to his primary care provider.    Patient's blood pressure did decrease the clonidine however still slightly elevated patient advised that it will continue to go down over the next 12 hours.  Declines refills on his blood pressure medication.     Patient Instructions                                                            URI   If  your condition worsens or fails to improve we recommend that you receive another evaluation at the urgent care/ER immediately or contact your PCP to discuss your concerns. You must understand that you've received an urgent care treatment only and that you may be released before all your medical problems are known or treated. You the patient will arrange for follouwp care as instructed.   If we discussed that I think your illness is viral, it will not respond to antibiotics and will last 10-14 days.       Use your Zicam over-the-counter since that has worked the best for you in the past if that does not help you can use Flonase which is also over-the-counter  If you just have drainage you can take plain zyrtec, claritin or allegra   Over-the-counter cough syrups as we discussed if your symptoms worsen please advise  Rest and fluids are also important.     Salt water gargles, warm tea with honey and chloraseptic spray as needed for sore throat.   Tylenol or ibuprofen can also be used as directed for pain unless you have an allergy to them or medical condition such as stomach ulcers, kidney or liver disease or blood thinners etc for which you should not be taking these type of medications.     As we discussed her blood pressure will go down over the next 12 hours you do not wish for any refills on her medications today and declined EKG.  Reach out to your primary care to see if you are still established if you are not please call the office and I will put a referral in for you if your symptoms worsen please go to the ER for evaluation

## 2025-02-20 ENCOUNTER — PATIENT OUTREACH (OUTPATIENT)
Dept: ADMINISTRATIVE | Facility: HOSPITAL | Age: 55
End: 2025-02-20